# Patient Record
Sex: FEMALE | Race: WHITE | Employment: OTHER | URBAN - METROPOLITAN AREA
[De-identification: names, ages, dates, MRNs, and addresses within clinical notes are randomized per-mention and may not be internally consistent; named-entity substitution may affect disease eponyms.]

---

## 2017-01-17 ENCOUNTER — TELEPHONE (OUTPATIENT)
Dept: CARDIOLOGY CLINIC | Age: 67
End: 2017-01-17

## 2017-01-18 RX ORDER — RANOLAZINE 500 MG/1
TABLET, EXTENDED RELEASE ORAL
Qty: 56 TABLET | Refills: 0 | COMMUNITY
Start: 2017-01-18 | End: 2017-04-19 | Stop reason: SDUPTHER

## 2017-02-13 RX ORDER — ATORVASTATIN CALCIUM 20 MG/1
TABLET, FILM COATED ORAL
Qty: 30 TABLET | Refills: 6 | Status: SHIPPED | OUTPATIENT
Start: 2017-02-13 | End: 2017-08-10 | Stop reason: SDUPTHER

## 2017-02-13 RX ORDER — LISINOPRIL 5 MG/1
TABLET ORAL
Qty: 30 TABLET | Refills: 6 | Status: SHIPPED | OUTPATIENT
Start: 2017-02-13 | End: 2017-08-10 | Stop reason: SDUPTHER

## 2017-03-13 RX ORDER — CLOPIDOGREL BISULFATE 75 MG/1
TABLET ORAL
Qty: 30 TABLET | Refills: 5 | Status: SHIPPED | OUTPATIENT
Start: 2017-03-13 | End: 2017-09-10 | Stop reason: SDUPTHER

## 2017-04-19 ENCOUNTER — TELEPHONE (OUTPATIENT)
Dept: CARDIOLOGY CLINIC | Age: 67
End: 2017-04-19

## 2017-04-19 RX ORDER — RANOLAZINE 500 MG/1
TABLET, EXTENDED RELEASE ORAL
Qty: 90 TABLET | Refills: 3 | Status: SHIPPED | OUTPATIENT
Start: 2017-04-19 | End: 2017-11-14 | Stop reason: SDUPTHER

## 2017-05-30 DIAGNOSIS — E78.2 MIXED HYPERLIPIDEMIA: ICD-10-CM

## 2017-05-30 LAB
ALBUMIN SERPL-MCNC: 4.2 G/DL (ref 3.4–5)
ALP BLD-CCNC: 47 U/L (ref 40–129)
ALT SERPL-CCNC: 24 U/L (ref 10–40)
AST SERPL-CCNC: 20 U/L (ref 15–37)
BILIRUB SERPL-MCNC: 0.3 MG/DL (ref 0–1)
BILIRUBIN DIRECT: <0.2 MG/DL (ref 0–0.3)
BILIRUBIN, INDIRECT: NORMAL MG/DL (ref 0–1)
CHOLESTEROL, TOTAL: 134 MG/DL (ref 0–199)
HDLC SERPL-MCNC: 33 MG/DL (ref 40–60)
LDL CHOLESTEROL CALCULATED: 58 MG/DL
TOTAL PROTEIN: 6.9 G/DL (ref 6.4–8.2)
TRIGL SERPL-MCNC: 215 MG/DL (ref 0–150)
VLDLC SERPL CALC-MCNC: 43 MG/DL

## 2017-06-14 ENCOUNTER — OFFICE VISIT (OUTPATIENT)
Dept: CARDIOLOGY CLINIC | Age: 67
End: 2017-06-14

## 2017-06-14 VITALS
HEART RATE: 64 BPM | HEIGHT: 63 IN | WEIGHT: 159 LBS | OXYGEN SATURATION: 97 % | SYSTOLIC BLOOD PRESSURE: 120 MMHG | DIASTOLIC BLOOD PRESSURE: 64 MMHG | BODY MASS INDEX: 28.17 KG/M2

## 2017-06-14 DIAGNOSIS — I10 ESSENTIAL HYPERTENSION: ICD-10-CM

## 2017-06-14 DIAGNOSIS — I25.810 CORONARY ARTERY DISEASE INVOLVING CORONARY BYPASS GRAFT OF NATIVE HEART WITHOUT ANGINA PECTORIS: Primary | ICD-10-CM

## 2017-06-14 DIAGNOSIS — E78.2 MIXED HYPERLIPIDEMIA: ICD-10-CM

## 2017-06-14 PROCEDURE — 99214 OFFICE O/P EST MOD 30 MIN: CPT | Performed by: INTERNAL MEDICINE

## 2017-08-10 DIAGNOSIS — I10 ESSENTIAL HYPERTENSION: Primary | ICD-10-CM

## 2017-08-10 DIAGNOSIS — I25.810 CORONARY ARTERY DISEASE INVOLVING CORONARY BYPASS GRAFT OF NATIVE HEART WITHOUT ANGINA PECTORIS: ICD-10-CM

## 2017-08-10 DIAGNOSIS — E78.2 MIXED HYPERLIPIDEMIA: ICD-10-CM

## 2017-08-11 RX ORDER — ATORVASTATIN CALCIUM 20 MG/1
TABLET, FILM COATED ORAL
Qty: 30 TABLET | Refills: 4 | Status: SHIPPED | OUTPATIENT
Start: 2017-08-11 | End: 2017-11-14 | Stop reason: SDUPTHER

## 2017-08-11 RX ORDER — LISINOPRIL 5 MG/1
TABLET ORAL
Qty: 30 TABLET | Refills: 4 | Status: SHIPPED | OUTPATIENT
Start: 2017-08-11 | End: 2018-01-16 | Stop reason: SDUPTHER

## 2017-09-11 RX ORDER — CLOPIDOGREL BISULFATE 75 MG/1
TABLET ORAL
Qty: 30 TABLET | Refills: 5 | Status: SHIPPED | OUTPATIENT
Start: 2017-09-11 | End: 2018-03-15 | Stop reason: SDUPTHER

## 2017-11-14 ENCOUNTER — TELEPHONE (OUTPATIENT)
Dept: CARDIOLOGY CLINIC | Age: 67
End: 2017-11-14

## 2017-11-14 RX ORDER — RANOLAZINE 500 MG/1
TABLET, EXTENDED RELEASE ORAL
Qty: 56 TABLET | Refills: 0 | COMMUNITY
Start: 2017-11-14 | End: 2018-05-01 | Stop reason: SDUPTHER

## 2017-11-14 RX ORDER — ATORVASTATIN CALCIUM 20 MG/1
TABLET, FILM COATED ORAL
Qty: 30 TABLET | Refills: 6 | Status: SHIPPED | OUTPATIENT
Start: 2017-11-14 | End: 2018-05-17 | Stop reason: SDUPTHER

## 2017-11-14 NOTE — TELEPHONE ENCOUNTER
Patient last seen on 6/14/17. Scheduled to return on 12/18/17. Prepared 4 cards of samples, 14 pills per card, total of 56 tablets. Called patient and let her know that she can  at [facility] office.

## 2017-11-14 NOTE — TELEPHONE ENCOUNTER
Sample requested: Ranexa    Strength:  500 mg    Dosage: BID    Patient's call back number: [phone]

## 2017-12-14 RX ORDER — FENOFIBRATE 160 MG/1
TABLET ORAL
Qty: 90 TABLET | Refills: 3 | Status: SHIPPED | OUTPATIENT
Start: 2017-12-14 | End: 2018-12-12 | Stop reason: SDUPTHER

## 2018-01-10 ENCOUNTER — OFFICE VISIT (OUTPATIENT)
Dept: CARDIOLOGY CLINIC | Age: 68
End: 2018-01-10

## 2018-01-10 VITALS
HEIGHT: 63 IN | DIASTOLIC BLOOD PRESSURE: 78 MMHG | SYSTOLIC BLOOD PRESSURE: 134 MMHG | WEIGHT: 164 LBS | HEART RATE: 70 BPM | OXYGEN SATURATION: 97 % | BODY MASS INDEX: 29.06 KG/M2

## 2018-01-10 DIAGNOSIS — I25.810 CORONARY ARTERY DISEASE INVOLVING CORONARY BYPASS GRAFT OF NATIVE HEART WITHOUT ANGINA PECTORIS: Primary | ICD-10-CM

## 2018-01-10 DIAGNOSIS — E78.2 MIXED HYPERLIPIDEMIA: ICD-10-CM

## 2018-01-10 DIAGNOSIS — I10 ESSENTIAL HYPERTENSION: ICD-10-CM

## 2018-01-10 PROCEDURE — 93000 ELECTROCARDIOGRAM COMPLETE: CPT | Performed by: INTERNAL MEDICINE

## 2018-01-10 PROCEDURE — 99214 OFFICE O/P EST MOD 30 MIN: CPT | Performed by: INTERNAL MEDICINE

## 2018-01-10 NOTE — PATIENT INSTRUCTIONS
[email or web address], and be sure to contact your doctor if:  ? · You would like help planning heart-healthy meals. Where can you learn more? Go to [email or web address] and sign in to your [employer] account. Enter V137 in the [employer] box to learn more about \"Heart-Healthy Diet: Care Instructions. \"     If you do not have an account, please click on the \"Sign Up Now\" link. Current as of: September 21, 2016  Content Version: 11.5  © 7923-2606 [employer]. Care instructions adapted under license by [address] If you have questions about a medical condition or this instruction, always ask your healthcare professional. [place] [address] any warranty or liability for your use of this information. Patient Education        High Blood Pressure: Care Instructions  Your Care Instructions    If your blood pressure is usually above 140/90, you have high blood pressure, or hypertension. That means the top number is 140 or higher or the bottom number is 90 or higher, or both. Despite what a lot of people think, high blood pressure usually doesn't cause headaches or make you feel dizzy or lightheaded. It usually has no symptoms. But it does increase your risk for heart attack, stroke, and kidney or eye damage. The higher your blood pressure, the more your risk increases. Your doctor will give you a goal for your blood pressure. Your goal will be based on your health and your age. An example of a goal is to keep your blood pressure below 140/90. Lifestyle changes, such as eating healthy and being active, are always important to help lower blood pressure. You might also take medicine to reach your blood pressure goal.  Follow-up care is a key part of your treatment and safety. Be sure to make and go to all appointments, and call your doctor if you are having problems. It's also a good idea to know your test results and keep a list of the medicines you take.   How can you care for yourself at home? Medical treatment  · If you stop taking your medicine, your blood pressure will go back up. You may take one or more types of medicine to lower your blood pressure. Be safe with medicines. Take your medicine exactly as prescribed. Call your doctor if you think you are having a problem with your medicine. · Talk to your doctor before you start taking aspirin every day. Aspirin can help certain people lower their risk of a heart attack or stroke. But taking aspirin isn't right for everyone, because it can cause serious bleeding. · See your doctor regularly. You may need to see the doctor more often at first or until your blood pressure comes down. · If you are taking blood pressure medicine, talk to your doctor before you take decongestants or anti-inflammatory medicine, such as ibuprofen. Some of these medicines can raise blood pressure. · Learn how to check your blood pressure at home. Lifestyle changes  · Stay at a healthy weight. This is especially important if you put on weight around the waist. Losing even 10 pounds can help you lower your blood pressure. · If your doctor recommends it, get more exercise. Walking is a good choice. Bit by bit, increase the amount you walk every day. Try for at least 30 minutes on most days of the week. You also may want to swim, bike, or do other activities. · Avoid or limit alcohol. Talk to your doctor about whether you can drink any alcohol. · Try to limit how much sodium you eat to less than 2,300 milligrams (mg) a day. Your doctor may ask you to try to eat less than 1,500 mg a day. · Eat plenty of fruits (such as bananas and oranges), vegetables, legumes, whole grains, and low-fat dairy products. · Lower the amount of saturated fat in your diet. Saturated fat is found in animal products such as milk, cheese, and meat. Limiting these foods may help you lose weight and also lower your risk for heart disease. · Do not smoke.  Smoking

## 2018-01-10 NOTE — PROGRESS NOTES
[address]                     Cardiology Progress Note    Dutch He      January 10, 2018     CC: \"I had a few episodes of chest pain\"    HPI:  The patient is 79 y.o. female with a past medical history significant for hypertension, hyperlipidemia, diabetes, CAD s/p stent placement, s/p CABG 2011, depression s/p CABG. Bailee Mosher is here today for a follow up for her CAD. She is feeling well and doing great except for an episode of chest pain she after walking to her car in the extreme cold last week. Otherwise symptoms free and able to perform all ADLs without difficulty. She is compliant and tolerating all his medications. She denies CP, pressure, tightness, edema, SOB, heart racing, palpitations, lightheaded, dizziness, PND or orthopnea.       Past Medical History:   Diagnosis Date    CAD (coronary artery disease)     Diabetes mellitus ([facility]     Hyperlipidemia     Hypertension     Kidney calculi     lithitripsy x2 2009 dec    Pneumonia 2009    Vegetarian diet      Past Surgical History:   Procedure Laterality Date    APPENDECTOMY      BACK SURGERY       SECTION      CHOLECYSTECTOMY      CORONARY ANGIOPLASTY WITH STENT PLACEMENT      LITHOTRIPSY      TONSILLECTOMY      UPPER GASTROINTESTINAL ENDOSCOPY       Family History   Problem Relation Age of Onset    Other Mother     Other Father     Diabetes Maternal Grandmother      Social History   Substance Use Topics    Smoking status: Never Smoker    Smokeless tobacco: Never Used    Alcohol use No       Allergies   Allergen Reactions    Effient [Prasugrel] Swelling     Swelling in tongue, itching    Azithromycin      Thrush on tongue    Dilaudid [Hydromorphone Hcl]      Severe head pain and ear pressure, very weak    Dolobid [Diflunisal] Nausea Only    Doxycycline      n  & v      Vibramycin [Doxycycline Calcium]      Severe nausea and vomiting    Morphine Sulfate Nausea And Vomiting Review of Systems:  Review of systems is as detailed above and all other systems are normal.      Physical Exam:   /78   Pulse 70   Ht 5' 3\" (1.6 m)   Wt 164 lb (74.4 kg) Comment: without shoes  SpO2 97%   BMI 29.05 kg/m²   Wt Readings from Last 3 Encounters:   01/10/18 164 lb (74.4 kg)   06/14/17 159 lb (72.1 kg)   11/29/16 158 lb 9.6 oz (71.9 kg)     Constitutional: She is oriented to person, place, and time. She appears well-developed and well-nourished. In no acute distress. Head: Normocephalic and atraumatic. Pupils equal and round. Neck: Neck supple. No JVP or carotid bruit appreciated. No mass and no thyromegaly present. No lymphadenopathy present. Cardiovascular: Normal rate. Normal heart sounds. Exam reveals no gallop and no friction rub. No murmur heard. Pulmonary/Chest: Effort normal and breath sounds normal. No respiratory distress. She has no wheezes, rhonchi or rales. Abdominal: Soft, non-tender. Bowel sounds are normal. She exhibits no organomegaly, mass or bruit. Extremities: No edema, cyanosis, or clubbing. Pulses are 2+ radial/dorsalis pedis/posterior tibial/carotid bilaterally. Neurological: No gross cranial nerve deficit. Coordination normal.   Skin: Skin is warm and dry. There is no rash or diaphoresis. Psychiatric: She has a normal mood and affect.  Her speech is normal and behavior is normal.   Current Outpatient Prescriptions   Medication Sig Dispense Refill    fenofibrate 160 MG tablet TAKE 1 TABLET EVERY DAY 90 tablet 3    atorvastatin (LIPITOR) 20 MG tablet TAKE 1 TABLET NIGHTLY 30 tablet 6    ranolazine (RANEXA) 500 MG extended release tablet TAKE ONE TABLET BY MOUTH TWICE DAILY 56 tablet 0    clopidogrel (PLAVIX) 75 MG tablet TAKE 1 TABLET EVERY DAY 30 tablet 5    lisinopril (PRINIVIL;ZESTRIL) 5 MG tablet TAKE 1 TABLET EVERY DAY 30 tablet 4    nitroGLYCERIN (NITROSTAT) 0.4 MG SL tablet Place 1 tablet under the tongue every 5 minutes as needed for Chest pain 25 tablet 6    niacin 500 MG tablet Take 500 mg by mouth 3 times daily      Handicap Placard MISC by Does not apply route Handicap to  in 5 years for patient's history of CAD. Patient unable to walk more than 200 feet without stopping to rest. 1 each 0    metoprolol (LOPRESSOR) 25 MG tablet Take 1 tablet by mouth 2 times daily. 180 tablet 3    Multiple Vitamins-Minerals (CENTRUM SILVER PO) Take  by mouth daily.  aspirin EC 81 MG EC tablet Take 1 tablet by mouth daily. 30 tablet 3    omeprazole (PRILOSEC) 20 MG capsule Take 20 mg by mouth as needed.  promethazine (PHENERGAN) 25 MG tablet Take 25 mg by mouth every 6 hours as needed for Nausea.  meclizine (ANTIVERT) 25 MG tablet Take 25 mg by mouth 3 times daily as needed.  Omega-3 Fatty Acids (FISH OIL BURP-LESS) 1200 MG CAPS Take 2,400 mg by mouth.  metformin (GLUCOPHAGE) 500 MG tablet Take 500 mg by mouth 4 times daily (before meals and nightly)       cetirizine (ZYRTEC) 10 MG tablet Take 10 mg by mouth as needed. No current facility-administered medications for this visit.       Facility-Administered Medications Ordered in Other Visits   Medication Dose Route Frequency Provider Last Rate Last Dose    aminocaproic acid (AMICAR) 5 g in sodium chloride 0.9 % 250 mL IV bolus  5 g Intravenous Once Margot Simon MD        insulin regular (HUMULIN R;NOVOLIN R) 100 Units in sodium chloride 0.9 % 100 mL infusion  0.1 Units/kg/hr Intravenous Once Margot Simon MD        phenylephrine (COLIN-SYNEPHRINE) 50 mg in dextrose 5 % 250 mL infusion  100 mcg/min Intravenous Once Margot Simon MD        lactated ringers infusion   Intravenous Once Margot Simon MD        pantoprazole (PROTONIX) injection 40 mg  40 mg Intravenous Once Lily Ortiz MD           Lab Review:     Lab Results   Component Value Date    TRIG 215 2017    HDL 33 2017    HDL 27 2012    LDLCALC 58 2017    LDLDIRECT 71 10/27/2016    LABVLDL 43 05/30/2017    10/27/16  Lab Results   Component Value Date    HGB 12.4 07/16/2014    HCT 37.8 07/16/2014       Lab Results   Component Value Date     08/10/2016      Lab Results   Component Value Date    K 4.7 08/10/2016     Lab Results   Component Value Date    BUN 13 08/10/2016    CREATININE 0.8 08/10/2016   HgbA1c 6.2 11/17/16    EKG interpretation: 1/10 2018-normal sinus rhythm with minor ST and T wave abnormalities    Image Review:     CABGx3 4/2011 at [facility] per Dr. Gemma Doss    Stress:10/10/2013  Summary   Normal myocardial perfusion study. Normal LV size and systolic function. ECG findings reported separately. Cath:3/27/2014  POSTOPERATIVE DIAGNOSES:  1. Stenosis 90% to 95% proximal, 40% to 50% mid and 80% distal right coronary artery stenosis with a competitive flow in the posterolateral and posterior descending artery branches. 2. Patent left main trunk. 3. Mild disease of the proximal left anterior descending and 100% mid left anterior descending occlusion. 4. Stenosis 50% to 60% of the proximal portion of the obtuse marginal branch and 80% stenosis of the mid portion of the obtuse marginal branch. 5. Saphenous vein graft to distal right coronary artery widely patent with mild aneurysmal dilatation of the saphenous vein graft. 6. Patent saphenous vein graft to the diagonal branch with evidence of mild aneurysmal dilatation. 7. Patent left internal mammary artery to the left anterior descending with diffuse disease of the left anterior descending beyond anastomosis. 8. Normal left ventricular systolic function with a _____ of 55% to 60% with normal left heart hemodynamics. 9. Patent femoral and iliac artery. 10. Successful angioplasty of the circumflex artery which was partially complicated by a proximal dissection requiring 2 Drug-Eluting stents with a final diameter of the proximal area of 2.42 and a distal area of 2.37.      EKG 1/10 2018-normal sinus rhythm with minor ST and T wave abnormalities. Assessment/Plan:     Coronary artery disease  Pt denies symptoms of angina today. Pt is on BBlocker, Statin and Antiplatelet therapy.      HTN (hypertension)  Blood pressure is stable today. Renal panel from 5/2017 reveals stable creatinine of 0.72.       Hyperlipidemia  Last lipid profile from 5/30/17 showed significant improvement with TG now down to 215 from 312. Her hemoglbin A1C is down to 6.2% . I will repeat lipid profile before the next visit. Patient to follow up 6 months. Thank you very much for allowing me to participate in the care of your patient. Please do not hesitate to contact me if you have any questions.     Sincerely,  Mila Gómez MD      [address], [address] [place] [address] Zak Harley [address]  Ph: [phone]  Fax: [phone]

## 2018-01-16 RX ORDER — LISINOPRIL 5 MG/1
TABLET ORAL
Qty: 30 TABLET | Refills: 5 | Status: SHIPPED | OUTPATIENT
Start: 2018-01-16 | End: 2018-08-09 | Stop reason: SDUPTHER

## 2018-01-16 NOTE — TELEPHONE ENCOUNTER
Last O/V:  1/10/18  Next O/V:  7/16/18    Last Labs:  CMP:  6/14/17 ( In [facility])    Please sign in Dr. Kiet Powers absence, thank you.

## 2018-03-15 RX ORDER — CLOPIDOGREL BISULFATE 75 MG/1
TABLET ORAL
Qty: 30 TABLET | Refills: 12 | Status: SHIPPED | OUTPATIENT
Start: 2018-03-15 | End: 2019-04-01 | Stop reason: SDUPTHER

## 2018-05-01 RX ORDER — RANOLAZINE 500 MG/1
TABLET, FILM COATED, EXTENDED RELEASE ORAL
Qty: 180 TABLET | Refills: 3 | Status: SHIPPED | OUTPATIENT
Start: 2018-05-01 | End: 2018-05-08 | Stop reason: SDUPTHER

## 2018-05-08 RX ORDER — RANOLAZINE 500 MG/1
TABLET, FILM COATED, EXTENDED RELEASE ORAL
Qty: 180 TABLET | Refills: 2 | Status: SHIPPED | OUTPATIENT
Start: 2018-05-08 | End: 2019-03-28 | Stop reason: SDUPTHER

## 2018-05-18 RX ORDER — ATORVASTATIN CALCIUM 20 MG/1
TABLET, FILM COATED ORAL
Qty: 30 TABLET | Refills: 2 | Status: SHIPPED | OUTPATIENT
Start: 2018-05-18 | End: 2018-07-11 | Stop reason: ALTCHOICE

## 2018-06-11 ENCOUNTER — TELEPHONE (OUTPATIENT)
Dept: CARDIOLOGY CLINIC | Age: 68
End: 2018-06-11

## 2018-06-20 RX ORDER — NITROGLYCERIN 0.4 MG/1
0.4 TABLET SUBLINGUAL EVERY 5 MIN PRN
Qty: 25 TABLET | Refills: 6 | Status: SHIPPED | OUTPATIENT
Start: 2018-06-20 | End: 2020-03-02

## 2018-07-10 DIAGNOSIS — E78.2 MIXED HYPERLIPIDEMIA: ICD-10-CM

## 2018-07-10 LAB
A/G RATIO: 1.8 (ref 1.1–2.2)
ALBUMIN SERPL-MCNC: 4.4 G/DL (ref 3.4–5)
ALP BLD-CCNC: 55 U/L (ref 40–129)
ALT SERPL-CCNC: 23 U/L (ref 10–40)
ANION GAP SERPL CALCULATED.3IONS-SCNC: 17 MMOL/L (ref 3–16)
AST SERPL-CCNC: 23 U/L (ref 15–37)
BILIRUB SERPL-MCNC: <0.2 MG/DL (ref 0–1)
BUN BLDV-MCNC: 14 MG/DL (ref 7–20)
CALCIUM SERPL-MCNC: 9.9 MG/DL (ref 8.3–10.6)
CHLORIDE BLD-SCNC: 102 MMOL/L (ref 99–110)
CHOLESTEROL, TOTAL: 148 MG/DL (ref 0–199)
CO2: 25 MMOL/L (ref 21–32)
CREAT SERPL-MCNC: 0.7 MG/DL (ref 0.6–1.2)
GFR AFRICAN AMERICAN: >60
GFR NON-AFRICAN AMERICAN: >60
GLOBULIN: 2.4 G/DL
GLUCOSE BLD-MCNC: 154 MG/DL (ref 70–99)
HDLC SERPL-MCNC: 27 MG/DL (ref 40–60)
LDL CHOLESTEROL CALCULATED: ABNORMAL MG/DL
LDL CHOLESTEROL DIRECT: 81 MG/DL
POTASSIUM SERPL-SCNC: 4.7 MMOL/L (ref 3.5–5.1)
SODIUM BLD-SCNC: 144 MMOL/L (ref 136–145)
TOTAL PROTEIN: 6.8 G/DL (ref 6.4–8.2)
TRIGL SERPL-MCNC: 385 MG/DL (ref 0–150)
VLDLC SERPL CALC-MCNC: ABNORMAL MG/DL

## 2018-07-11 RX ORDER — ROSUVASTATIN CALCIUM 20 MG/1
20 TABLET, COATED ORAL DAILY
Qty: 30 TABLET | Refills: 12 | Status: SHIPPED | OUTPATIENT
Start: 2018-07-11 | End: 2019-08-03 | Stop reason: SDUPTHER

## 2018-07-16 ENCOUNTER — OFFICE VISIT (OUTPATIENT)
Dept: CARDIOLOGY CLINIC | Age: 68
End: 2018-07-16

## 2018-07-16 VITALS
BODY MASS INDEX: 29.41 KG/M2 | HEART RATE: 66 BPM | WEIGHT: 166 LBS | OXYGEN SATURATION: 97 % | HEIGHT: 63 IN | DIASTOLIC BLOOD PRESSURE: 76 MMHG | SYSTOLIC BLOOD PRESSURE: 132 MMHG

## 2018-07-16 DIAGNOSIS — I10 ESSENTIAL HYPERTENSION: Primary | ICD-10-CM

## 2018-07-16 DIAGNOSIS — E78.2 MIXED HYPERLIPIDEMIA: ICD-10-CM

## 2018-07-16 DIAGNOSIS — I25.708 CORONARY ARTERY DISEASE OF BYPASS GRAFT OF NATIVE HEART WITH STABLE ANGINA PECTORIS (HCC): ICD-10-CM

## 2018-07-16 PROCEDURE — 93000 ELECTROCARDIOGRAM COMPLETE: CPT | Performed by: INTERNAL MEDICINE

## 2018-07-16 PROCEDURE — 99215 OFFICE O/P EST HI 40 MIN: CPT | Performed by: INTERNAL MEDICINE

## 2018-07-16 NOTE — PATIENT INSTRUCTIONS
Patient Education        Heart-Healthy Diet: Care Instructions  Your Care Instructions    A heart-healthy diet has lots of vegetables, fruits, nuts, beans, and whole grains, and is low in salt. It limits foods that are high in saturated fat, such as meats, cheeses, and fried foods. It may be hard to change your diet, but even small changes can lower your risk of heart attack and heart disease. Follow-up care is a key part of your treatment and safety. Be sure to make and go to all appointments, and call your doctor if you are having problems. It's also a good idea to know your test results and keep a list of the medicines you take. How can you care for yourself at home? Watch your portions  · Learn what a serving is. A \"serving\" and a \"portion\" are not always the same thing. Make sure that you are not eating larger portions than are recommended. For example, a serving of pasta is ½ cup. A serving size of meat is 2 to 3 ounces. A 3-ounce serving is about the size of a deck of cards. Measure serving sizes until you are good at [place]" them. Keep in mind that restaurants often serve portions that are 2 or 3 times the size of one serving. · To keep your energy level up and keep you from feeling hungry, eat often but in smaller portions. · Eat only the number of calories you need to stay at a healthy weight. If you need to lose weight, eat fewer calories than your body burns (through exercise and other physical activity). Eat more fruits and vegetables  · Eat a variety of fruit and vegetables every day. Dark green, deep orange, red, or yellow fruits and vegetables are especially good for you. Examples include spinach, carrots, peaches, and berries. · Keep carrots, celery, and other veggies handy for snacks. Buy fruit that is in season and store it where you can see it so that you will be tempted to eat it. · Cook dishes that have a lot of veggies in them, such as stir-fries and soups.   Limit saturated and trans fat  · Read food labels, and try to avoid saturated and trans fats. They increase your risk of heart disease. Trans fat is found in many processed foods such as cookies and crackers. · Use olive or canola oil when you cook. Try cholesterol-lowering spreads, such as Benecol or Take Control. · Bake, broil, grill, or steam foods instead of frying them. · Choose lean meats instead of high-fat meats such as hot dogs and sausages. Cut off all visible fat when you prepare meat. · Eat fish, skinless poultry, and meat alternatives such as soy products instead of high-fat meats. Soy products, such as tofu, may be especially good for your heart. · Choose low-fat or fat-free milk and dairy products. Eat fish  · Eat at least two servings of fish a week. Certain fish, such as salmon and tuna, contain omega-3 fatty acids, which may help reduce your risk of heart attack. Eat foods high in fiber  · Eat a variety of grain products every day. Include whole-grain foods that have lots of fiber and nutrients. Examples of whole-grain foods include oats, whole wheat bread, and brown rice. · Buy whole-grain breads and cereals, instead of white bread or pastries. Limit salt and sodium  · Limit how much salt and sodium you eat to help lower your blood pressure. · Taste food before you salt it. Add only a little salt when you think you need it. With time, your taste buds will adjust to less salt. · Eat fewer snack items, fast foods, and other high-salt, processed foods. Check food labels for the amount of sodium in packaged foods. · Choose low-sodium versions of canned goods (such as soups, vegetables, and beans). Limit sugar  · Limit drinks and foods with added sugar. These include candy, desserts, and soda pop. Limit alcohol  · Limit alcohol to no more than 2 drinks a day for men and 1 drink a day for women. Too much alcohol can cause health problems. When should you call for help?   Watch closely for changes in your quitting, talk to your doctor about stop-smoking programs and medicines. These can increase your chances of quitting for good. When should you call for help? Call 911 anytime you think you may need emergency care. This may mean having symptoms that suggest that your blood pressure is causing a serious heart or blood vessel problem. Your blood pressure may be over 180/110.   For example, call 911 if:    · You have symptoms of a heart attack. These may include:  ¨ Chest pain or pressure, or a strange feeling in the chest.  ¨ Sweating. ¨ Shortness of breath. ¨ Nausea or vomiting. ¨ Pain, pressure, or a strange feeling in the back, neck, jaw, or upper belly or in one or both shoulders or arms. ¨ Lightheadedness or sudden weakness. ¨ A fast or irregular heartbeat.     · You have symptoms of a stroke. These may include:  ¨ Sudden numbness, tingling, weakness, or loss of movement in your face, arm, or leg, especially on only one side of your body. ¨ Sudden vision changes. ¨ Sudden trouble speaking. ¨ Sudden confusion or trouble understanding simple statements. ¨ Sudden problems with walking or balance. ¨ A sudden, severe headache that is different from past headaches.     · You have severe back or belly pain.    Do not wait until your blood pressure comes down on its own. Get help right away.   Call your doctor now or seek immediate care if:    · Your blood pressure is much higher than normal (such as 180/110 or higher), but you don't have symptoms.     · You think high blood pressure is causing symptoms, such as:  ¨ Severe headache. ¨ Blurry vision.    Watch closely for changes in your health, and be sure to contact your doctor if:    · Your blood pressure measures 140/90 or higher at least 2 times.  That means the top number is 140 or higher or the bottom number is 90 or higher, or both.     · You think you may be having side effects from your blood pressure medicine.     · Your blood pressure is usually normal, but it goes above normal at least 2 times. Where can you learn more? Go to [email or web address] and sign in to your [employer] [facility] account. Enter Y078 in the [facility] box to learn more about \"High Blood Pressure: Care Instructions. \"     If you do not have an account, please click on the \"Sign Up Now\" link. Current as of: December 6, 2017  Content Version: 11.6  © 9127-6429 [employer]. Care instructions adapted under license by [facility] ([facility]). If you have questions about a medical condition or this instruction, always ask your healthcare professional. [address] any warranty or liability for your use of this information.

## 2018-07-16 NOTE — PROGRESS NOTES
MG tablet TAKE 1 TABLET EVERY DAY 90 tablet 3    niacin 500 MG tablet Take 500 mg by mouth 3 times daily      Handicap Placard MISC by Does not apply route Handicap to  in 5 years for patient's history of CAD. Patient unable to walk more than 200 feet without stopping to rest. 1 each 0    metoprolol (LOPRESSOR) 25 MG tablet Take 1 tablet by mouth 2 times daily. 180 tablet 3    Multiple Vitamins-Minerals (CENTRUM SILVER PO) Take  by mouth daily.  aspirin EC 81 MG EC tablet Take 1 tablet by mouth daily. 30 tablet 3    omeprazole (PRILOSEC) 20 MG capsule Take 20 mg by mouth as needed.  promethazine (PHENERGAN) 25 MG tablet Take 25 mg by mouth every 6 hours as needed for Nausea.  meclizine (ANTIVERT) 25 MG tablet Take 25 mg by mouth 3 times daily as needed.  Omega-3 Fatty Acids (FISH OIL BURP-LESS) 1200 MG CAPS Take 2,400 mg by mouth.  metformin (GLUCOPHAGE) 500 MG tablet Take 500 mg by mouth 4 times daily (before meals and nightly)       cetirizine (ZYRTEC) 10 MG tablet Take 10 mg by mouth as needed. No current facility-administered medications for this visit.       Facility-Administered Medications Ordered in Other Visits   Medication Dose Route Frequency Provider Last Rate Last Dose    aminocaproic acid (AMICAR) 5 g in sodium chloride 0.9 % 250 mL IV bolus  5 g Intravenous Once Irma Arango MD        insulin regular (HUMULIN R;NOVOLIN R) 100 Units in sodium chloride 0.9 % 100 mL infusion  0.1 Units/kg/hr Intravenous Once Irma Arango MD        phenylephrine (COLIN-SYNEPHRINE) 50 mg in dextrose 5 % 250 mL infusion  100 mcg/min Intravenous Once Irma Arango MD        lactated ringers infusion   Intravenous Once Irma Arango MD        pantoprazole (PROTONIX) injection 40 mg  40 mg Intravenous Once Anusha Gandhi MD           Lab Review:     Lab Results   Component Value Date    TRIG 385 07/10/2018    HDL 27 07/10/2018    HDL 27 2012    LDLCALC see below 07/10/2018    LDLDIRECT 81 07/10/2018    LABVLDL see below 07/10/2018    10/27/16  Lab Results   Component Value Date    HGB 12.4 07/16/2014    HCT 37.8 07/16/2014       Lab Results   Component Value Date     07/10/2018      Lab Results   Component Value Date    K 4.7 07/10/2018     Lab Results   Component Value Date    BUN 14 07/10/2018    CREATININE 0.7 07/10/2018   HgbA1c 6.2 11/17/16    EKG interpretation: 1/10 2018-normal sinus rhythm with minor ST and T wave abnormalities     7/16/18-Sinus  Rhythm Nonspecific T-abnormality. Image Review:     CABGx3 4/2011 at [facility] per Dr. Amisha Hammond    Stress:10/10/2013  Summary   Normal myocardial perfusion study. Normal LV size and systolic function. ECG findings reported separately. Cath:3/27/2014  POSTOPERATIVE DIAGNOSES:  1. Stenosis 90% to 95% proximal, 40% to 50% mid and 80% distal right coronary artery stenosis with a competitive flow in the posterolateral and posterior descending artery branches. 2. Patent left main trunk. 3. Mild disease of the proximal left anterior descending and 100% mid left anterior descending occlusion. 4. Stenosis 50% to 60% of the proximal portion of the obtuse marginal branch and 80% stenosis of the mid portion of the obtuse marginal branch. 5. Saphenous vein graft to distal right coronary artery widely patent with mild aneurysmal dilatation of the saphenous vein graft. 6. Patent saphenous vein graft to the diagonal branch with evidence of mild aneurysmal dilatation. 7. Patent left internal mammary artery to the left anterior descending with diffuse disease of the left anterior descending beyond anastomosis. 8. Normal left ventricular systolic function with a _____ of 55% to 60% with normal left heart hemodynamics. 9. Patent femoral and iliac artery.   10. Successful angioplasty of the circumflex artery which was partially complicated by a proximal dissection requiring 2 Drug-Eluting stents with a final diameter of the

## 2018-07-20 ENCOUNTER — HOSPITAL ENCOUNTER (OUTPATIENT)
Dept: NON INVASIVE DIAGNOSTICS | Age: 68
Discharge: HOME | End: 2018-07-20
Admitting: INTERNAL MEDICINE

## 2018-07-20 DIAGNOSIS — I25.708 ATHEROSCLEROSIS OF CORONARY ARTERY BYPASS GRAFT(S), UNSPECIFIED, WITH OTHER FORMS OF ANGINA PECTORIS (HCC): ICD-10-CM

## 2018-07-20 LAB
LV EF: 70 %
LVEF MODALITY: NORMAL

## 2018-07-30 ENCOUNTER — OFFICE VISIT (OUTPATIENT)
Dept: CARDIOLOGY CLINIC | Age: 68
End: 2018-07-30

## 2018-07-30 VITALS
BODY MASS INDEX: 29.41 KG/M2 | HEART RATE: 72 BPM | SYSTOLIC BLOOD PRESSURE: 136 MMHG | WEIGHT: 166 LBS | OXYGEN SATURATION: 97 % | HEIGHT: 63 IN | DIASTOLIC BLOOD PRESSURE: 74 MMHG

## 2018-07-30 DIAGNOSIS — I25.10 CORONARY ARTERY DISEASE INVOLVING NATIVE CORONARY ARTERY OF NATIVE HEART WITHOUT ANGINA PECTORIS: Primary | ICD-10-CM

## 2018-07-30 DIAGNOSIS — E78.5 HYPERLIPIDEMIA LDL GOAL <70: ICD-10-CM

## 2018-07-30 DIAGNOSIS — I10 ESSENTIAL HYPERTENSION: ICD-10-CM

## 2018-07-30 PROCEDURE — 99214 OFFICE O/P EST MOD 30 MIN: CPT | Performed by: INTERNAL MEDICINE

## 2018-07-30 NOTE — PROGRESS NOTES
tablet 3    niacin 500 MG tablet Take 500 mg by mouth 3 times daily      Handicap Placard MISC by Does not apply route Handicap to  in 5 years for patient's history of CAD. Patient unable to walk more than 200 feet without stopping to rest. 1 each 0    metoprolol (LOPRESSOR) 25 MG tablet Take 1 tablet by mouth 2 times daily. 180 tablet 3    Multiple Vitamins-Minerals (CENTRUM SILVER PO) Take  by mouth daily.  aspirin EC 81 MG EC tablet Take 1 tablet by mouth daily. 30 tablet 3    omeprazole (PRILOSEC) 20 MG capsule Take 20 mg by mouth as needed.  promethazine (PHENERGAN) 25 MG tablet Take 25 mg by mouth every 6 hours as needed for Nausea.  meclizine (ANTIVERT) 25 MG tablet Take 25 mg by mouth 3 times daily as needed.  Omega-3 Fatty Acids (FISH OIL BURP-LESS) 1200 MG CAPS Take 2,400 mg by mouth.  metformin (GLUCOPHAGE) 500 MG tablet Take 500 mg by mouth 4 times daily (before meals and nightly)       cetirizine (ZYRTEC) 10 MG tablet Take 10 mg by mouth as needed. No current facility-administered medications for this visit.       Facility-Administered Medications Ordered in Other Visits   Medication Dose Route Frequency Provider Last Rate Last Dose    aminocaproic acid (AMICAR) 5 g in sodium chloride 0.9 % 250 mL IV bolus  5 g Intravenous Once Wyatt Dubon MD        insulin regular (HUMULIN R;NOVOLIN R) 100 Units in sodium chloride 0.9 % 100 mL infusion  0.1 Units/kg/hr Intravenous Once Wyatt Dubon MD        phenylephrine (COLIN-SYNEPHRINE) 50 mg in dextrose 5 % 250 mL infusion  100 mcg/min Intravenous Once Wyatt Dubon MD        lactated ringers infusion   Intravenous Once Wyatt Dubon MD        pantoprazole (PROTONIX) injection 40 mg  40 mg Intravenous Once Tacho Jerome MD           Lab Review:     Lab Results   Component Value Date    TRIG 385 07/10/2018    HDL 27 07/10/2018    HDL 27 2012    LDLCALC see below 07/10/2018    LDLDIRECT 81 a distal area of 2.37. Stress test 7/20/18      1. Technically a satisfactory study.    2. Non diagnostic pharmacological stress portion of the study.    3. Moderate sized mild intensity reversible defect involving the inferior    wall that may suggest ischemia. Bowel adjacent to inferior wall may affect    the accuracy of the study.    4. Gated Study shows normal LV size and Systolic function; EF is 70 %. EKG 1/10 2018-normal sinus rhythm with minor ST and T wave abnormalities. Assessment/Plan:     Coronary artery disease  Patient is experiencing exertional chest tightness suggestive of angina today. Her nuclear stress test from 7/20/18 was abnormal for reversible ischemia in inf wall. I have recommended a repeat angiogram. She would like to take time to decide if she would like to proceed. I have told her to keep fresh supply of s/l NG & go to ER$ if her CP does not resolve after 2 NG. She may participate in light exercise. HTN (hypertension)  Blood pressure is stable today. Renal panel from 7/2018 reveals stable creatinine of 0.7.       Hyperlipidemia  Continue Crestor 20 mg daily. Will repeat fasting lipid profile in 3 months as discussed at last office visit. Patient to follow up 3 months. Thank you very much for allowing me to participate in the care of your patient. Please do not hesitate to contact me if you have any questions.     Sincerely,  Christina Cabrera MD      [address] [place] [facility]  Ph: [phone]  Fax: [phone]

## 2018-07-30 NOTE — PATIENT INSTRUCTIONS
Patient Education        Learning About Coronary Artery Disease (CAD)  What is coronary artery disease? Coronary artery disease (CAD) occurs when plaque builds up in the arteries that bring oxygen-rich blood to your heart. Plaque is a fatty substance made of cholesterol, calcium, and other substances in the blood. This process is called hardening of the arteries, or atherosclerosis. What happens when you have coronary artery disease? · Plaque may narrow the coronary arteries. Narrowed arteries cause poor blood flow. This can lead to angina symptoms such as chest pain or discomfort. If blood flow is completely blocked, you could have a heart attack. · You can slow CAD and reduce the risk of future problems by making changes in your lifestyle. These include quitting smoking and eating heart-healthy foods. · Treatments for CAD, along with changes in your lifestyle, can help you live a longer and healthier life. How can you prevent coronary artery disease? · Do not smoke. It may be the best thing you can do to prevent heart disease. If you need help quitting, talk to your doctor about stop-smoking programs and medicines. These can increase your chances of quitting for good. · Be active. Get at least 30 minutes of exercise on most days of the week. Walking is a good choice. You also may want to do other activities, such as running, swimming, cycling, or playing tennis or team sports. · Eat heart-healthy foods. Eat more fruits and vegetables and less foods that contain saturated and trans fats. Limit alcohol, sodium, and sweets. · Stay at a healthy weight. Lose weight if you need to. · Manage other health problems such as diabetes, high blood pressure, and high cholesterol. · Manage stress. Stress can hurt your heart. To keep stress low, talk about your problems and feelings. Don't keep your feelings hidden. · If you have talked about it with your doctor, take a low-dose aspirin every day.  Aspirin can help certain people lower their risk of a heart attack or stroke. But taking aspirin isn't right for everyone, because it can cause serious bleeding. Do not start taking daily aspirin unless your doctor knows about it. How is coronary artery disease treated? · Your doctor will suggest that you make lifestyle changes. For example, your doctor may ask you to eat healthy foods, quit smoking, lose extra weight, and be more active. · You will have to take medicines. · Your doctor may suggest a procedure to open narrowed or blocked arteries. This is called angioplasty. Or your doctor may suggest using healthy blood vessels to create detours around narrowed or blocked arteries. This is called bypass surgery. Follow-up care is a key part of your treatment and safety. Be sure to make and go to all appointments, and call your doctor if you are having problems. It's also a good idea to know your test results and keep a list of the medicines you take. Where can you learn more? Go to https://appweevr.[employer]. org and sign in to your Qu[employer]by account. Enter [phone] in the [employer].com box to learn more about \"Learning About Coronary Artery Disease (CAD). \"     If you do not have an account, please click on the \"Sign Up Now\" link. Current as of: December 6, 2017  Content Version: 11.6  © 1065-4911 [employer]. Care instructions adapted under license by [facility] ([facility]). If you have questions about a medical condition or this instruction, always ask your healthcare professional. [place] [address] any warranty or liability for your use of this information. Patient Education        Heart-Healthy Diet: Care Instructions  Your Care Instructions    A heart-healthy diet has lots of vegetables, fruits, nuts, beans, and whole grains, and is low in salt. It limits foods that are high in saturated fat, such as meats, cheeses, and fried foods.  It may be hard to change your diet, but steam foods instead of frying them. · Choose lean meats instead of high-fat meats such as hot dogs and sausages. Cut off all visible fat when you prepare meat. · Eat fish, skinless poultry, and meat alternatives such as soy products instead of high-fat meats. Soy products, such as tofu, may be especially good for your heart. · Choose low-fat or fat-free milk and dairy products. Eat fish  · Eat at least two servings of fish a week. Certain fish, such as salmon and tuna, contain omega-3 fatty acids, which may help reduce your risk of heart attack. Eat foods high in fiber  · Eat a variety of grain products every day. Include whole-grain foods that have lots of fiber and nutrients. Examples of whole-grain foods include oats, whole wheat bread, and brown rice. · Buy whole-grain breads and cereals, instead of white bread or pastries. Limit salt and sodium  · Limit how much salt and sodium you eat to help lower your blood pressure. · Taste food before you salt it. Add only a little salt when you think you need it. With time, your taste buds will adjust to less salt. · Eat fewer snack items, fast foods, and other high-salt, processed foods. Check food labels for the amount of sodium in packaged foods. · Choose low-sodium versions of canned goods (such as soups, vegetables, and beans). Limit sugar  · Limit drinks and foods with added sugar. These include candy, desserts, and soda pop. Limit alcohol  · Limit alcohol to no more than 2 drinks a day for men and 1 drink a day for women. Too much alcohol can cause health problems. When should you call for help? Watch closely for changes in your health, and be sure to contact your doctor if:    · You would like help planning heart-healthy meals. Where can you learn more? Go to https://chmaggyeb.[facility]. org and sign in to your [employer] Ink account. Enter V137 in the [facility] box to learn more about \"Heart-Healthy Diet: Care Instructions. \"

## 2018-08-09 RX ORDER — LISINOPRIL 5 MG/1
TABLET ORAL
Qty: 90 TABLET | Refills: 1 | Status: SHIPPED | OUTPATIENT
Start: 2018-08-09 | End: 2018-12-07 | Stop reason: SDUPTHER

## 2018-11-05 DIAGNOSIS — E78.2 MIXED HYPERLIPIDEMIA: ICD-10-CM

## 2018-11-05 LAB
A/G RATIO: 1.7 (ref 1.1–2.2)
ALBUMIN SERPL-MCNC: 4.5 G/DL (ref 3.4–5)
ALP BLD-CCNC: 52 U/L (ref 40–129)
ALT SERPL-CCNC: 21 U/L (ref 10–40)
ANION GAP SERPL CALCULATED.3IONS-SCNC: 16 MMOL/L (ref 3–16)
AST SERPL-CCNC: 20 U/L (ref 15–37)
BILIRUB SERPL-MCNC: <0.2 MG/DL (ref 0–1)
BUN BLDV-MCNC: 18 MG/DL (ref 7–20)
CALCIUM SERPL-MCNC: 10.2 MG/DL (ref 8.3–10.6)
CHLORIDE BLD-SCNC: 104 MMOL/L (ref 99–110)
CHOLESTEROL, TOTAL: 135 MG/DL (ref 0–199)
CO2: 23 MMOL/L (ref 21–32)
CREAT SERPL-MCNC: 0.8 MG/DL (ref 0.6–1.2)
GFR AFRICAN AMERICAN: >60
GFR NON-AFRICAN AMERICAN: >60
GLOBULIN: 2.6 G/DL
GLUCOSE BLD-MCNC: 144 MG/DL (ref 70–99)
HDLC SERPL-MCNC: 28 MG/DL (ref 40–60)
LDL CHOLESTEROL CALCULATED: 50 MG/DL
POTASSIUM SERPL-SCNC: 4.7 MMOL/L (ref 3.5–5.1)
SODIUM BLD-SCNC: 143 MMOL/L (ref 136–145)
TOTAL PROTEIN: 7.1 G/DL (ref 6.4–8.2)
TRIGL SERPL-MCNC: 285 MG/DL (ref 0–150)
VLDLC SERPL CALC-MCNC: 57 MG/DL

## 2018-11-12 ENCOUNTER — OFFICE VISIT (OUTPATIENT)
Dept: CARDIOLOGY CLINIC | Age: 68
End: 2018-11-12
Payer: COMMERCIAL

## 2018-11-12 VITALS
WEIGHT: 165 LBS | OXYGEN SATURATION: 98 % | BODY MASS INDEX: 29.23 KG/M2 | SYSTOLIC BLOOD PRESSURE: 134 MMHG | HEART RATE: 78 BPM | HEIGHT: 63 IN | DIASTOLIC BLOOD PRESSURE: 64 MMHG

## 2018-11-12 DIAGNOSIS — E78.2 MIXED HYPERLIPIDEMIA: ICD-10-CM

## 2018-11-12 DIAGNOSIS — I10 ESSENTIAL HYPERTENSION: ICD-10-CM

## 2018-11-12 DIAGNOSIS — I25.10 CORONARY ARTERY DISEASE INVOLVING NATIVE CORONARY ARTERY OF NATIVE HEART WITHOUT ANGINA PECTORIS: Primary | ICD-10-CM

## 2018-11-12 PROCEDURE — 99214 OFFICE O/P EST MOD 30 MIN: CPT | Performed by: INTERNAL MEDICINE

## 2018-11-12 NOTE — PROGRESS NOTES
Handicap to  in 5 years for patient's history of CAD. Patient unable to walk more than 200 feet without stopping to rest. 1 each 0    metoprolol (LOPRESSOR) 25 MG tablet Take 1 tablet by mouth 2 times daily. 180 tablet 3    Multiple Vitamins-Minerals (CENTRUM SILVER PO) Take  by mouth daily.  aspirin EC 81 MG EC tablet Take 1 tablet by mouth daily. 30 tablet 3    omeprazole (PRILOSEC) 20 MG capsule Take 20 mg by mouth as needed.  promethazine (PHENERGAN) 25 MG tablet Take 25 mg by mouth every 6 hours as needed for Nausea.  meclizine (ANTIVERT) 25 MG tablet Take 25 mg by mouth 3 times daily as needed.  Omega-3 Fatty Acids (FISH OIL BURP-LESS) 1200 MG CAPS Take 2,400 mg by mouth.  metformin (GLUCOPHAGE) 500 MG tablet Take 500 mg by mouth 4 times daily (before meals and nightly)       cetirizine (ZYRTEC) 10 MG tablet Take 10 mg by mouth as needed. No current facility-administered medications for this visit.       Facility-Administered Medications Ordered in Other Visits   Medication Dose Route Frequency Provider Last Rate Last Dose    aminocaproic acid (AMICAR) 5 g in sodium chloride 0.9 % 250 mL IV bolus  5 g Intravenous Once Breezy Beatty MD        insulin regular (HUMULIN R;NOVOLIN R) 100 Units in sodium chloride 0.9 % 100 mL infusion  0.1 Units/kg/hr Intravenous Once Breezy Beatty MD        phenylephrine (COLIN-SYNEPHRINE) 50 mg in dextrose 5 % 250 mL infusion  100 mcg/min Intravenous Once Breezy Beatty MD        lactated ringers infusion   Intravenous Once Breezy Beatty MD        pantoprazole (PROTONIX) injection 40 mg  40 mg Intravenous Once Alfred Waller MD           Lab Review:     Lab Results   Component Value Date    TRIG 285 2018    HDL 28 2018    HDL 27 2012    LDLCALC 50 2018    LDLDIRECT 81 07/10/2018    LABVLDL 57 2018    10/27/16  Lab Results   Component Value Date    HGB 12.4 2014    HCT 37.8 2014

## 2018-12-10 RX ORDER — LISINOPRIL 5 MG/1
TABLET ORAL
Qty: 90 TABLET | Refills: 3 | Status: SHIPPED | OUTPATIENT
Start: 2018-12-10 | End: 2019-12-17 | Stop reason: SDUPTHER

## 2018-12-12 RX ORDER — FENOFIBRATE 160 MG/1
TABLET ORAL
Qty: 90 TABLET | Refills: 3 | Status: SHIPPED | OUTPATIENT
Start: 2018-12-12 | End: 2019-12-10 | Stop reason: SDUPTHER

## 2019-03-29 RX ORDER — RANOLAZINE 500 MG/1
TABLET, FILM COATED, EXTENDED RELEASE ORAL
Qty: 180 TABLET | Refills: 2 | Status: SHIPPED | OUTPATIENT
Start: 2019-03-29 | End: 2019-04-19 | Stop reason: SDUPTHER

## 2019-04-01 RX ORDER — CLOPIDOGREL BISULFATE 75 MG/1
TABLET ORAL
Qty: 30 TABLET | Refills: 12 | Status: SHIPPED | OUTPATIENT
Start: 2019-04-01 | End: 2020-04-06

## 2019-04-18 ENCOUNTER — TELEPHONE (OUTPATIENT)
Dept: CARDIOLOGY CLINIC | Age: 69
End: 2019-04-18

## 2019-04-18 NOTE — TELEPHONE ENCOUNTER
[facility] is calling stating that Jazmin Garcia started on mxHero and it broke her out in a rash all over her body. Wants to know if there is another drug to start her on?     [facility] can be reached at [phone]

## 2019-04-18 NOTE — TELEPHONE ENCOUNTER
Called patient [phone]. Patient said that [facility] pharmacy switch from Ranexa to generic she has rash and welts all over and lips swelling. Do you want me to start a PA for name brand or switch to something else? Patient did stop medication and knows not to take anymore. She is already taking benadryl for symptoms.

## 2019-04-18 NOTE — TELEPHONE ENCOUNTER
Called [facility] [phone]. Spoke to Pharmacist.  Requested that she send PA to office to complete.

## 2019-04-25 RX ORDER — RANOLAZINE 500 MG/1
TABLET, FILM COATED, EXTENDED RELEASE ORAL
Qty: 180 TABLET | Refills: 2 | Status: SHIPPED | OUTPATIENT
Start: 2019-04-25 | End: 2020-01-10

## 2019-05-01 DIAGNOSIS — E78.01 FAMILIAL HYPERCHOLESTEROLEMIA: ICD-10-CM

## 2019-05-01 DIAGNOSIS — I25.10 CORONARY ARTERY DISEASE INVOLVING NATIVE CORONARY ARTERY OF NATIVE HEART WITHOUT ANGINA PECTORIS: Primary | ICD-10-CM

## 2019-05-06 DIAGNOSIS — E78.01 FAMILIAL HYPERCHOLESTEROLEMIA: ICD-10-CM

## 2019-05-06 DIAGNOSIS — I25.10 CORONARY ARTERY DISEASE INVOLVING NATIVE CORONARY ARTERY OF NATIVE HEART WITHOUT ANGINA PECTORIS: ICD-10-CM

## 2019-05-06 LAB
A/G RATIO: 1.3 (ref 1.1–2.2)
ALBUMIN SERPL-MCNC: 4 G/DL (ref 3.4–5)
ALP BLD-CCNC: 71 U/L (ref 40–129)
ALT SERPL-CCNC: 19 U/L (ref 10–40)
ANION GAP SERPL CALCULATED.3IONS-SCNC: 16 MMOL/L (ref 3–16)
AST SERPL-CCNC: 18 U/L (ref 15–37)
BILIRUB SERPL-MCNC: <0.2 MG/DL (ref 0–1)
BUN BLDV-MCNC: 15 MG/DL (ref 7–20)
CALCIUM SERPL-MCNC: 10 MG/DL (ref 8.3–10.6)
CHLORIDE BLD-SCNC: 100 MMOL/L (ref 99–110)
CHOLESTEROL, FASTING: 145 MG/DL (ref 0–199)
CO2: 24 MMOL/L (ref 21–32)
CREAT SERPL-MCNC: 0.8 MG/DL (ref 0.6–1.2)
GFR AFRICAN AMERICAN: >60
GFR NON-AFRICAN AMERICAN: >60
GLOBULIN: 3 G/DL
GLUCOSE FASTING: 231 MG/DL (ref 70–99)
HDLC SERPL-MCNC: 23 MG/DL (ref 40–60)
LDL CHOLESTEROL CALCULATED: ABNORMAL MG/DL
LDL CHOLESTEROL DIRECT: 64 MG/DL
POTASSIUM SERPL-SCNC: 4.9 MMOL/L (ref 3.5–5.1)
SODIUM BLD-SCNC: 140 MMOL/L (ref 136–145)
TOTAL PROTEIN: 7 G/DL (ref 6.4–8.2)
TRIGLYCERIDE, FASTING: 543 MG/DL (ref 0–150)
VLDLC SERPL CALC-MCNC: ABNORMAL MG/DL

## 2019-05-13 NOTE — PROGRESS NOTES
[facility]                     Cardiology Progress Note    Elfego Garibay      May 15, 2019     CC: \"I am feeling okay. \"     HPI:  The patient is 76 y.o. female with a past medical history significant for hypertension, hyperlipidemia, diabetes, CAD s/p stent placement, s/p CABG 2011, depression s/p CABG. Today, she is here for her CAD management. She says that she is doing well except for elevated triglycerides. She has been under a lot of stress. She says that her and her  are going to start going to silver sneakers soon. Patient denies exertional chest pain/pressure, dyspnea at rest, CASSIDY, PND, orthopnea, palpitations, lightheadedness, weight changes, changes in LE edema, and syncope. Patient reports compliance to her medications.     Past Medical History:   Diagnosis Date    CAD (coronary artery disease)     Diabetes mellitus ([address]     Hyperlipidemia     Hypertension     Kidney calculi     lithitripsy x2 2009 dec    Pneumonia 2009    Vegetarian diet      Past Surgical History:   Procedure Laterality Date    APPENDECTOMY      BACK SURGERY       SECTION      CHOLECYSTECTOMY      CORONARY ANGIOPLASTY WITH STENT PLACEMENT      LITHOTRIPSY      TONSILLECTOMY      UPPER GASTROINTESTINAL ENDOSCOPY       Family History   Problem Relation Age of Onset    Other Mother     Other Father     Diabetes Maternal Grandmother      Social History     Tobacco Use    Smoking status: Never Smoker    Smokeless tobacco: Never Used   Substance Use Topics    Alcohol use: No    Drug use: No       Allergies   Allergen Reactions    Effient [Prasugrel] Swelling     Swelling in tongue, itching    Azithromycin      Thrush on tongue    Dilaudid [Hydromorphone Hcl]      Severe head pain and ear pressure, very weak    Dolobid [Diflunisal] Nausea Only    Doxycycline      n  & v      Ranolazine Er Hives, Itching and Swelling     Can only take name brand Ranexa  Can not take generic    Vibramycin [Doxycycline Calcium]      Severe nausea and vomiting    Morphine Sulfate Nausea And Vomiting     Review of Systems:  Review of systems is as detailed above and all other systems are normal.      Physical Exam:   /74   Pulse 68   Ht 5' 3\" (1.6 m)   Wt 168 lb (76.2 kg) Comment: did not wish to remove shoes  SpO2 97%   BMI 29.76 kg/m²   Wt Readings from Last 3 Encounters:   05/15/19 168 lb (76.2 kg)   11/12/18 165 lb (74.8 kg)   07/30/18 166 lb (75.3 kg)     Constitutional: She is oriented to person, place, and time. She appears well-developed and well-nourished. In no acute distress. Head: Normocephalic and atraumatic. Pupils equal and round. Neck: Neck supple. No JVP or carotid bruit appreciated. No mass and no thyromegaly present. No lymphadenopathy present. Cardiovascular: Normal rate. Normal heart sounds. Exam reveals no gallop and no friction rub. No murmur heard. Pulmonary/Chest: Effort normal and breath sounds normal. No respiratory distress. She has no wheezes, rhonchi or rales. Abdominal: Soft, non-tender. Bowel sounds are normal. She exhibits no organomegaly, mass or bruit. Extremities: No edema, cyanosis, or clubbing. Pulses are 2+ radial/dorsalis pedis/posterior tibial/carotid bilaterally. Neurological: No gross cranial nerve deficit. Coordination normal.   Skin: Skin is warm and dry. There is no rash or diaphoresis. Psychiatric: She has a normal mood and affect.  Her speech is normal and behavior is normal.   Current Outpatient Medications   Medication Sig Dispense Refill    RANEXA 500 MG extended release tablet TAKE 1 TABLET BY MOUTH TWICE A  tablet 2    clopidogrel (PLAVIX) 75 MG tablet TAKE 1 TABLET EVERY DAY 30 tablet 12    fenofibrate 160 MG tablet TAKE 1 TABLET EVERY DAY 90 tablet 3    lisinopril (PRINIVIL;ZESTRIL) 5 MG tablet TAKE 1 TABLET BY MOUTH EVERY DAY 90 tablet 3    rosuvastatin (CRESTOR) 20 MG tablet Take 1 tablet by mouth daily 30 tablet 12    nitroGLYCERIN (NITROSTAT) 0.4 MG SL tablet PLACE 1 TABLET UNDER THE TONGUE EVERY 5 MINUTES AS NEEDED FOR CHEST PAIN 25 tablet 6    niacin 500 MG tablet Take 500 mg by mouth 3 times daily      Handicap Placard MISC by Does not apply route Handicap to  in 5 years for patient's history of CAD. Patient unable to walk more than 200 feet without stopping to rest. 1 each 0    metoprolol (LOPRESSOR) 25 MG tablet Take 1 tablet by mouth 2 times daily. 180 tablet 3    Multiple Vitamins-Minerals (CENTRUM SILVER PO) Take  by mouth daily.  aspirin EC 81 MG EC tablet Take 1 tablet by mouth daily. 30 tablet 3    omeprazole (PRILOSEC) 20 MG capsule Take 20 mg by mouth as needed.  promethazine (PHENERGAN) 25 MG tablet Take 25 mg by mouth every 6 hours as needed for Nausea.  meclizine (ANTIVERT) 25 MG tablet Take 25 mg by mouth 3 times daily as needed.  Omega-3 Fatty Acids (FISH OIL BURP-LESS) 1200 MG CAPS Take 2,400 mg by mouth.  metformin (GLUCOPHAGE) 500 MG tablet Take 500 mg by mouth 4 times daily (before meals and nightly)       cetirizine (ZYRTEC) 10 MG tablet Take 10 mg by mouth as needed. No current facility-administered medications for this visit.       Facility-Administered Medications Ordered in Other Visits   Medication Dose Route Frequency Provider Last Rate Last Dose    aminocaproic acid (AMICAR) 5 g in sodium chloride 0.9 % 250 mL IV bolus  5 g Intravenous Once Dawson Loyd MD        insulin regular (HUMULIN R;NOVOLIN R) 100 Units in sodium chloride 0.9 % 100 mL infusion  0.1 Units/kg/hr Intravenous Once Dawson Loyd MD        phenylephrine (COLIN-SYNEPHRINE) 50 mg in dextrose 5 % 250 mL infusion  100 mcg/min Intravenous Once Dawson Loyd MD        lactated ringers infusion   Intravenous Once Dawson Lyod MD        pantoprazole (PROTONIX) injection 40 mg  40 mg Intravenous Once Vero Yost MD           Lab Review:     Lab Results Component Value Date    TRIG 285 11/05/2018    HDL 23 05/06/2019    HDL 27 04/18/2012    LDLCALC see below 05/06/2019    LDLDIRECT 64 05/06/2019    LABVLDL see below 05/06/2019    10/27/16  Lab Results   Component Value Date    HGB 12.4 07/16/2014    HCT 37.8 07/16/2014       Lab Results   Component Value Date     05/06/2019      Lab Results   Component Value Date    K 4.9 05/06/2019     Lab Results   Component Value Date    BUN 15 05/06/2019    CREATININE 0.8 05/06/2019   HgbA1c 6.2 11/17/16    EKG interpretation: 1/10 2018-normal sinus rhythm with minor ST and T wave abnormalities     7/16/18-Sinus  Rhythm Nonspecific T-abnormality. Image Review:     CABGx3 4/[facility] at [facility] per Dr. Eda Vitale    Stress:10/10/2013  Summary   Normal myocardial perfusion study. Normal LV size and systolic function. ECG findings reported separately. Cath:3/27/2014  POSTOPERATIVE DIAGNOSES:  1. Stenosis 90% to 95% proximal, 40% to 50% mid and 80% distal right coronary artery stenosis with a competitive flow in the posterolateral and posterior descending artery branches. 2. Patent left main trunk. 3. Mild disease of the proximal left anterior descending and 100% mid left anterior descending occlusion. 4. Stenosis 50% to 60% of the proximal portion of the obtuse marginal branch and 80% stenosis of the mid portion of the obtuse marginal branch. 5. Saphenous vein graft to distal right coronary artery widely patent with mild aneurysmal dilatation of the saphenous vein graft. 6. Patent saphenous vein graft to the diagonal branch with evidence of mild aneurysmal dilatation. 7. Patent left internal mammary artery to the left anterior descending with diffuse disease of the left anterior descending beyond anastomosis. 8. Normal left ventricular systolic function with a _____ of 55% to 60% with normal left heart hemodynamics. 9. Patent femoral and iliac artery.   10. Successful angioplasty of the circumflex artery which [facility], [address] [place] [address]   [facility]  Ph: [phone]  Fax: [phone]

## 2019-05-15 ENCOUNTER — OFFICE VISIT (OUTPATIENT)
Dept: CARDIOLOGY CLINIC | Age: 69
End: 2019-05-15
Payer: COMMERCIAL

## 2019-05-15 VITALS
HEART RATE: 68 BPM | SYSTOLIC BLOOD PRESSURE: 124 MMHG | HEIGHT: 63 IN | DIASTOLIC BLOOD PRESSURE: 74 MMHG | OXYGEN SATURATION: 97 % | BODY MASS INDEX: 29.77 KG/M2 | WEIGHT: 168 LBS

## 2019-05-15 DIAGNOSIS — E78.2 MIXED HYPERLIPIDEMIA: ICD-10-CM

## 2019-05-15 DIAGNOSIS — I10 ESSENTIAL HYPERTENSION: ICD-10-CM

## 2019-05-15 DIAGNOSIS — I25.10 CORONARY ARTERY DISEASE INVOLVING NATIVE CORONARY ARTERY OF NATIVE HEART WITHOUT ANGINA PECTORIS: Primary | ICD-10-CM

## 2019-05-15 PROCEDURE — 99214 OFFICE O/P EST MOD 30 MIN: CPT | Performed by: INTERNAL MEDICINE

## 2019-05-15 NOTE — PATIENT INSTRUCTIONS
Patient Education        Heart-Healthy Diet: Care Instructions  Your Care Instructions    A heart-healthy diet has lots of vegetables, fruits, nuts, beans, and whole grains, and is low in salt. It limits foods that are high in saturated fat, such as meats, cheeses, and fried foods. It may be hard to change your diet, but even small changes can lower your risk of heart attack and heart disease. Follow-up care is a key part of your treatment and safety. Be sure to make and go to all appointments, and call your doctor if you are having problems. It's also a good idea to know your test results and keep a list of the medicines you take. How can you care for yourself at home? Watch your portions  · Learn what a serving is. A \"serving\" and a \"portion\" are not always the same thing. Make sure that you are not eating larger portions than are recommended. For example, a serving of pasta is ½ cup. A serving size of meat is 2 to 3 ounces. A 3-ounce serving is about the size of a deck of cards. Measure serving sizes until you are good at [place]" them. Keep in mind that restaurants often serve portions that are 2 or 3 times the size of one serving. · To keep your energy level up and keep you from feeling hungry, eat often but in smaller portions. · Eat only the number of calories you need to stay at a healthy weight. If you need to lose weight, eat fewer calories than your body burns (through exercise and other physical activity). Eat more fruits and vegetables  · Eat a variety of fruit and vegetables every day. Dark green, deep orange, red, or yellow fruits and vegetables are especially good for you. Examples include spinach, carrots, peaches, and berries. · Keep carrots, celery, and other veggies handy for snacks. Buy fruit that is in season and store it where you can see it so that you will be tempted to eat it. · Cook dishes that have a lot of veggies in them, such as stir-fries and soups.   Limit saturated and [employer], and be sure to contact your doctor if:    · You would like help planning heart-healthy meals. Where can you learn more? Go to https://chpepiceweb.[employer]. org and sign in to your Reclog account. Enter V137 in the [employer] box to learn more about \"Heart-Healthy Diet: Care Instructions. \"     If you do not have an account, please click on the \"Sign Up Now\" link. Current as of: July 22, 2018  Content Version: 12.0  © 3228-1138 [employer]. Care instructions adapted under license by [facility] ([facility]). If you have questions about a medical condition or this instruction, always ask your healthcare professional. [address] any warranty or liability for your use of this information.

## 2019-05-17 DIAGNOSIS — E78.2 MIXED HYPERLIPIDEMIA: ICD-10-CM

## 2019-05-17 LAB
CHOLESTEROL, FASTING: 131 MG/DL (ref 0–199)
HDLC SERPL-MCNC: 24 MG/DL (ref 40–60)
LDL CHOLESTEROL CALCULATED: ABNORMAL MG/DL
LDL CHOLESTEROL DIRECT: 61 MG/DL
TRIGLYCERIDE, FASTING: 388 MG/DL (ref 0–150)
VLDLC SERPL CALC-MCNC: ABNORMAL MG/DL

## 2019-05-21 ENCOUNTER — TELEPHONE (OUTPATIENT)
Dept: CARDIOLOGY CLINIC | Age: 69
End: 2019-05-21

## 2019-05-21 RX ORDER — ICOSAPENT ETHYL 1000 MG/1
2 CAPSULE ORAL 2 TIMES DAILY
Qty: 60 CAPSULE | Refills: 3 | Status: SHIPPED | OUTPATIENT
Start: 2019-05-21 | End: 2019-10-16 | Stop reason: SDUPTHER

## 2019-08-05 RX ORDER — ROSUVASTATIN CALCIUM 20 MG/1
TABLET, COATED ORAL
Qty: 90 TABLET | Refills: 3 | Status: SHIPPED | OUTPATIENT
Start: 2019-08-05 | End: 2020-02-25

## 2019-10-12 ENCOUNTER — APPOINTMENT (OUTPATIENT)
Dept: CT IMAGING | Age: 69
End: 2019-10-12
Payer: MEDICARE

## 2019-10-12 ENCOUNTER — APPOINTMENT (OUTPATIENT)
Dept: MRI IMAGING | Age: 69
End: 2019-10-12
Payer: MEDICARE

## 2019-10-12 ENCOUNTER — HOSPITAL ENCOUNTER (OUTPATIENT)
Age: 69
Setting detail: OBSERVATION
Discharge: HOME | End: 2019-10-14
Attending: EMERGENCY MEDICINE | Admitting: INTERNAL MEDICINE
Payer: MEDICARE

## 2019-10-12 DIAGNOSIS — R11.2 NAUSEA AND VOMITING, INTRACTABILITY OF VOMITING NOT SPECIFIED, UNSPECIFIED VOMITING TYPE: ICD-10-CM

## 2019-10-12 DIAGNOSIS — R42 VERTIGO: Primary | ICD-10-CM

## 2019-10-12 LAB
A/G RATIO: 1.5 (ref 1.1–2.2)
ALBUMIN SERPL-MCNC: 4.3 G/DL (ref 3.4–5)
ALP BLD-CCNC: 59 U/L (ref 40–129)
ALT SERPL-CCNC: 28 U/L (ref 10–40)
ANION GAP SERPL CALCULATED.3IONS-SCNC: 18 MMOL/L (ref 3–16)
AST SERPL-CCNC: 29 U/L (ref 15–37)
BASOPHILS ABSOLUTE: 0.1 K/UL (ref 0–0.2)
BASOPHILS RELATIVE PERCENT: 1.1 %
BILIRUB SERPL-MCNC: 0.3 MG/DL (ref 0–1)
BILIRUBIN URINE: NEGATIVE
BLOOD, URINE: NEGATIVE
BUN BLDV-MCNC: 15 MG/DL (ref 7–20)
CALCIUM SERPL-MCNC: 9.5 MG/DL (ref 8.3–10.6)
CHLORIDE BLD-SCNC: 103 MMOL/L (ref 99–110)
CLARITY: ABNORMAL
CO2: 20 MMOL/L (ref 21–32)
COLOR: YELLOW
CREAT SERPL-MCNC: 0.7 MG/DL (ref 0.6–1.2)
EOSINOPHILS ABSOLUTE: 1 K/UL (ref 0–0.6)
EOSINOPHILS RELATIVE PERCENT: 12.8 %
EPITHELIAL CELLS, UA: 0 /HPF (ref 0–5)
GFR AFRICAN AMERICAN: >60
GFR NON-AFRICAN AMERICAN: >60
GLOBULIN: 2.9 G/DL
GLUCOSE BLD-MCNC: 179 MG/DL (ref 70–99)
GLUCOSE BLD-MCNC: 199 MG/DL (ref 70–99)
GLUCOSE BLD-MCNC: 212 MG/DL (ref 70–99)
GLUCOSE URINE: NEGATIVE MG/DL
HCT VFR BLD CALC: 40.1 % (ref 36–48)
HEMOGLOBIN: 12.9 G/DL (ref 12–16)
HYALINE CASTS: 1 /LPF (ref 0–8)
KETONES, URINE: NEGATIVE MG/DL
LACTIC ACID: 3.3 MMOL/L (ref 0.4–2)
LEUKOCYTE ESTERASE, URINE: ABNORMAL
LYMPHOCYTES ABSOLUTE: 1.5 K/UL (ref 1–5.1)
LYMPHOCYTES RELATIVE PERCENT: 18.8 %
MCH RBC QN AUTO: 26.6 PG (ref 26–34)
MCHC RBC AUTO-ENTMCNC: 32.2 G/DL (ref 31–36)
MCV RBC AUTO: 82.7 FL (ref 80–100)
MICROSCOPIC EXAMINATION: YES
MONOCYTES ABSOLUTE: 0.5 K/UL (ref 0–1.3)
MONOCYTES RELATIVE PERCENT: 7.1 %
NEUTROPHILS ABSOLUTE: 4.7 K/UL (ref 1.7–7.7)
NEUTROPHILS RELATIVE PERCENT: 60.2 %
NITRITE, URINE: NEGATIVE
PDW BLD-RTO: 14.7 % (ref 12.4–15.4)
PERFORMED ON: ABNORMAL
PERFORMED ON: ABNORMAL
PH UA: 6.5 (ref 5–8)
PLATELET # BLD: 187 K/UL (ref 135–450)
PMV BLD AUTO: 9.2 FL (ref 5–10.5)
POTASSIUM REFLEX MAGNESIUM: 4.2 MMOL/L (ref 3.5–5.1)
PROTEIN UA: NEGATIVE MG/DL
RBC # BLD: 4.86 M/UL (ref 4–5.2)
RBC UA: 0 /HPF (ref 0–4)
SODIUM BLD-SCNC: 141 MMOL/L (ref 136–145)
SPECIFIC GRAVITY UA: 1.01 (ref 1–1.03)
TOTAL PROTEIN: 7.2 G/DL (ref 6.4–8.2)
TROPONIN: <0.01 NG/ML
URINE REFLEX TO CULTURE: YES
URINE TYPE: ABNORMAL
UROBILINOGEN, URINE: 0.2 E.U./DL
WBC # BLD: 7.8 K/UL (ref 4–11)
WBC UA: 6 /HPF (ref 0–5)

## 2019-10-12 PROCEDURE — 80053 COMPREHEN METABOLIC PANEL: CPT

## 2019-10-12 PROCEDURE — 85025 COMPLETE CBC W/AUTO DIFF WBC: CPT

## 2019-10-12 PROCEDURE — 96361 HYDRATE IV INFUSION ADD-ON: CPT

## 2019-10-12 PROCEDURE — 70450 CT HEAD/BRAIN W/O DYE: CPT

## 2019-10-12 PROCEDURE — 6370000000 HC RX 637 (ALT 250 FOR IP): Performed by: INTERNAL MEDICINE

## 2019-10-12 PROCEDURE — 96365 THER/PROPH/DIAG IV INF INIT: CPT

## 2019-10-12 PROCEDURE — 83605 ASSAY OF LACTIC ACID: CPT

## 2019-10-12 PROCEDURE — 96372 THER/PROPH/DIAG INJ SC/IM: CPT

## 2019-10-12 PROCEDURE — 2580000003 HC RX 258: Performed by: EMERGENCY MEDICINE

## 2019-10-12 PROCEDURE — 36415 COLL VENOUS BLD VENIPUNCTURE: CPT

## 2019-10-12 PROCEDURE — 6370000000 HC RX 637 (ALT 250 FOR IP): Performed by: EMERGENCY MEDICINE

## 2019-10-12 PROCEDURE — 81001 URINALYSIS AUTO W/SCOPE: CPT

## 2019-10-12 PROCEDURE — 96366 THER/PROPH/DIAG IV INF ADDON: CPT

## 2019-10-12 PROCEDURE — 93005 ELECTROCARDIOGRAM TRACING: CPT | Performed by: EMERGENCY MEDICINE

## 2019-10-12 PROCEDURE — 99285 EMERGENCY DEPT VISIT HI MDM: CPT

## 2019-10-12 PROCEDURE — 87086 URINE CULTURE/COLONY COUNT: CPT

## 2019-10-12 PROCEDURE — 6360000002 HC RX W HCPCS: Performed by: INTERNAL MEDICINE

## 2019-10-12 PROCEDURE — 94760 N-INVAS EAR/PLS OXIMETRY 1: CPT

## 2019-10-12 PROCEDURE — G0378 HOSPITAL OBSERVATION PER HR: HCPCS

## 2019-10-12 PROCEDURE — 84484 ASSAY OF TROPONIN QUANT: CPT

## 2019-10-12 PROCEDURE — 6360000002 HC RX W HCPCS: Performed by: EMERGENCY MEDICINE

## 2019-10-12 PROCEDURE — 2580000003 HC RX 258: Performed by: INTERNAL MEDICINE

## 2019-10-12 PROCEDURE — 70551 MRI BRAIN STEM W/O DYE: CPT

## 2019-10-12 RX ORDER — MECLIZINE HCL 12.5 MG/1
25 TABLET ORAL 3 TIMES DAILY
Status: DISCONTINUED | OUTPATIENT
Start: 2019-10-12 | End: 2019-10-14 | Stop reason: HOSPADM

## 2019-10-12 RX ORDER — PANTOPRAZOLE SODIUM 40 MG/1
40 TABLET, DELAYED RELEASE ORAL
Status: DISCONTINUED | OUTPATIENT
Start: 2019-10-13 | End: 2019-10-14 | Stop reason: HOSPADM

## 2019-10-12 RX ORDER — RANOLAZINE 500 MG/1
500 TABLET, EXTENDED RELEASE ORAL 2 TIMES DAILY
Status: DISCONTINUED | OUTPATIENT
Start: 2019-10-12 | End: 2019-10-14 | Stop reason: HOSPADM

## 2019-10-12 RX ORDER — ATORVASTATIN CALCIUM 40 MG/1
40 TABLET, FILM COATED ORAL NIGHTLY
Status: DISCONTINUED | OUTPATIENT
Start: 2019-10-12 | End: 2019-10-12

## 2019-10-12 RX ORDER — ROSUVASTATIN CALCIUM 10 MG/1
20 TABLET, COATED ORAL DAILY
Status: DISCONTINUED | OUTPATIENT
Start: 2019-10-13 | End: 2019-10-14 | Stop reason: HOSPADM

## 2019-10-12 RX ORDER — NIACIN 500 MG/1
500 TABLET, EXTENDED RELEASE ORAL NIGHTLY
Status: DISCONTINUED | OUTPATIENT
Start: 2019-10-12 | End: 2019-10-14 | Stop reason: HOSPADM

## 2019-10-12 RX ORDER — OMEPRAZOLE 20 MG/1
20 CAPSULE, DELAYED RELEASE ORAL EVERY MORNING
Status: DISCONTINUED | OUTPATIENT
Start: 2019-10-13 | End: 2019-10-12 | Stop reason: CLARIF

## 2019-10-12 RX ORDER — LISINOPRIL 5 MG/1
5 TABLET ORAL DAILY
Status: DISCONTINUED | OUTPATIENT
Start: 2019-10-13 | End: 2019-10-14 | Stop reason: HOSPADM

## 2019-10-12 RX ORDER — DEXTROSE MONOHYDRATE 25 G/50ML
12.5 INJECTION, SOLUTION INTRAVENOUS PRN
Status: DISCONTINUED | OUTPATIENT
Start: 2019-10-12 | End: 2019-10-14 | Stop reason: HOSPADM

## 2019-10-12 RX ORDER — OMEGA-3S/DHA/EPA/FISH OIL/D3 300MG-1000
2400 CAPSULE ORAL DAILY
Status: DISCONTINUED | OUTPATIENT
Start: 2019-10-12 | End: 2019-10-12

## 2019-10-12 RX ORDER — CLOPIDOGREL BISULFATE 75 MG/1
75 TABLET ORAL DAILY
Status: DISCONTINUED | OUTPATIENT
Start: 2019-10-13 | End: 2019-10-14 | Stop reason: HOSPADM

## 2019-10-12 RX ORDER — ONDANSETRON 2 MG/ML
4 INJECTION INTRAMUSCULAR; INTRAVENOUS EVERY 6 HOURS PRN
Status: DISCONTINUED | OUTPATIENT
Start: 2019-10-12 | End: 2019-10-14 | Stop reason: HOSPADM

## 2019-10-12 RX ORDER — SODIUM CHLORIDE 0.9 % (FLUSH) 0.9 %
10 SYRINGE (ML) INJECTION EVERY 12 HOURS SCHEDULED
Status: DISCONTINUED | OUTPATIENT
Start: 2019-10-12 | End: 2019-10-14 | Stop reason: HOSPADM

## 2019-10-12 RX ORDER — ASPIRIN 81 MG/1
81 TABLET ORAL DAILY
Status: DISCONTINUED | OUTPATIENT
Start: 2019-10-12 | End: 2019-10-14 | Stop reason: HOSPADM

## 2019-10-12 RX ORDER — ASPIRIN 300 MG/1
300 SUPPOSITORY RECTAL DAILY
Status: DISCONTINUED | OUTPATIENT
Start: 2019-10-12 | End: 2019-10-14 | Stop reason: HOSPADM

## 2019-10-12 RX ORDER — FENOFIBRATE 160 MG/1
160 TABLET ORAL DAILY
Status: DISCONTINUED | OUTPATIENT
Start: 2019-10-13 | End: 2019-10-14 | Stop reason: HOSPADM

## 2019-10-12 RX ORDER — NICOTINE POLACRILEX 4 MG
15 LOZENGE BUCCAL PRN
Status: DISCONTINUED | OUTPATIENT
Start: 2019-10-12 | End: 2019-10-14 | Stop reason: HOSPADM

## 2019-10-12 RX ORDER — 0.9 % SODIUM CHLORIDE 0.9 %
1000 INTRAVENOUS SOLUTION INTRAVENOUS ONCE
Status: COMPLETED | OUTPATIENT
Start: 2019-10-12 | End: 2019-10-12

## 2019-10-12 RX ORDER — MECLIZINE HCL 12.5 MG/1
25 TABLET ORAL ONCE
Status: COMPLETED | OUTPATIENT
Start: 2019-10-12 | End: 2019-10-12

## 2019-10-12 RX ORDER — PROMETHAZINE HYDROCHLORIDE 25 MG/1
25 TABLET ORAL EVERY 6 HOURS PRN
Status: DISCONTINUED | OUTPATIENT
Start: 2019-10-12 | End: 2019-10-14 | Stop reason: HOSPADM

## 2019-10-12 RX ORDER — ASPIRIN 81 MG/1
81 TABLET ORAL DAILY
Status: DISCONTINUED | OUTPATIENT
Start: 2019-10-12 | End: 2019-10-12 | Stop reason: SDUPTHER

## 2019-10-12 RX ORDER — PROMETHAZINE HYDROCHLORIDE 25 MG/ML
12.5 INJECTION, SOLUTION INTRAMUSCULAR; INTRAVENOUS EVERY 6 HOURS PRN
Status: DISCONTINUED | OUTPATIENT
Start: 2019-10-12 | End: 2019-10-12 | Stop reason: CLARIF

## 2019-10-12 RX ORDER — NITROGLYCERIN 0.4 MG/1
0.4 TABLET SUBLINGUAL EVERY 5 MIN PRN
Status: DISCONTINUED | OUTPATIENT
Start: 2019-10-12 | End: 2019-10-14 | Stop reason: HOSPADM

## 2019-10-12 RX ORDER — DEXTROSE MONOHYDRATE 50 MG/ML
100 INJECTION, SOLUTION INTRAVENOUS PRN
Status: DISCONTINUED | OUTPATIENT
Start: 2019-10-12 | End: 2019-10-14 | Stop reason: HOSPADM

## 2019-10-12 RX ORDER — SODIUM CHLORIDE 0.9 % (FLUSH) 0.9 %
10 SYRINGE (ML) INJECTION PRN
Status: DISCONTINUED | OUTPATIENT
Start: 2019-10-12 | End: 2019-10-14 | Stop reason: HOSPADM

## 2019-10-12 RX ORDER — ICOSAPENT ETHYL 1000 MG/1
2 CAPSULE ORAL 2 TIMES DAILY
Status: DISCONTINUED | OUTPATIENT
Start: 2019-10-12 | End: 2019-10-14 | Stop reason: HOSPADM

## 2019-10-12 RX ADMIN — ENOXAPARIN SODIUM 40 MG: 40 INJECTION SUBCUTANEOUS at 21:39

## 2019-10-12 RX ADMIN — MECLIZINE 25 MG: 12.5 TABLET ORAL at 15:50

## 2019-10-12 RX ADMIN — Medication 12.5 MG: at 15:50

## 2019-10-12 RX ADMIN — MECLIZINE 25 MG: 12.5 TABLET ORAL at 21:39

## 2019-10-12 RX ADMIN — SODIUM CHLORIDE 1000 ML: 9 INJECTION, SOLUTION INTRAVENOUS at 09:07

## 2019-10-12 RX ADMIN — Medication 10 ML: at 21:39

## 2019-10-12 RX ADMIN — NIACIN 500 MG: 500 TABLET, EXTENDED RELEASE ORAL at 21:39

## 2019-10-12 RX ADMIN — MECLIZINE 25 MG: 12.5 TABLET ORAL at 09:08

## 2019-10-12 RX ADMIN — METOPROLOL TARTRATE 25 MG: 25 TABLET ORAL at 21:39

## 2019-10-12 RX ADMIN — ASPIRIN 81 MG: 81 TABLET, COATED ORAL at 15:50

## 2019-10-12 RX ADMIN — Medication 25 MG: at 09:08

## 2019-10-12 ASSESSMENT — PAIN DESCRIPTION - PROGRESSION
CLINICAL_PROGRESSION: NOT CHANGED
CLINICAL_PROGRESSION: GRADUALLY IMPROVING

## 2019-10-12 ASSESSMENT — PAIN DESCRIPTION - ONSET
ONSET: ON-GOING
ONSET: GRADUAL

## 2019-10-12 ASSESSMENT — PAIN - FUNCTIONAL ASSESSMENT: PAIN_FUNCTIONAL_ASSESSMENT: ACTIVITIES ARE NOT PREVENTED

## 2019-10-12 ASSESSMENT — PAIN DESCRIPTION - FREQUENCY
FREQUENCY: CONTINUOUS
FREQUENCY: INTERMITTENT

## 2019-10-12 ASSESSMENT — PAIN SCALES - GENERAL
PAINLEVEL_OUTOF10: 0
PAINLEVEL_OUTOF10: 0
PAINLEVEL_OUTOF10: 8
PAINLEVEL_OUTOF10: 5
PAINLEVEL_OUTOF10: 0

## 2019-10-12 ASSESSMENT — PAIN DESCRIPTION - DESCRIPTORS
DESCRIPTORS: ACHING
DESCRIPTORS: ACHING

## 2019-10-12 ASSESSMENT — PAIN DESCRIPTION - LOCATION
LOCATION: HEAD
LOCATION: HEAD

## 2019-10-12 ASSESSMENT — PAIN DESCRIPTION - PAIN TYPE
TYPE: ACUTE PAIN
TYPE: ACUTE PAIN

## 2019-10-13 LAB
ANION GAP SERPL CALCULATED.3IONS-SCNC: 20 MMOL/L (ref 3–16)
BUN BLDV-MCNC: 14 MG/DL (ref 7–20)
CALCIUM SERPL-MCNC: 9.5 MG/DL (ref 8.3–10.6)
CHLORIDE BLD-SCNC: 106 MMOL/L (ref 99–110)
CHOLESTEROL, TOTAL: 141 MG/DL (ref 0–199)
CO2: 17 MMOL/L (ref 21–32)
CREAT SERPL-MCNC: 0.7 MG/DL (ref 0.6–1.2)
EKG ATRIAL RATE: 79 BPM
EKG DIAGNOSIS: NORMAL
EKG P AXIS: 59 DEGREES
EKG P-R INTERVAL: 124 MS
EKG Q-T INTERVAL: 410 MS
EKG QRS DURATION: 84 MS
EKG QTC CALCULATION (BAZETT): 470 MS
EKG R AXIS: 29 DEGREES
EKG T AXIS: 39 DEGREES
EKG VENTRICULAR RATE: 79 BPM
ESTIMATED AVERAGE GLUCOSE: 211.6 MG/DL
GFR AFRICAN AMERICAN: >60
GFR NON-AFRICAN AMERICAN: >60
GLUCOSE BLD-MCNC: 193 MG/DL (ref 70–99)
GLUCOSE BLD-MCNC: 195 MG/DL (ref 70–99)
GLUCOSE BLD-MCNC: 219 MG/DL (ref 70–99)
GLUCOSE BLD-MCNC: 225 MG/DL (ref 70–99)
HBA1C MFR BLD: 9 %
HCT VFR BLD CALC: 39.2 % (ref 36–48)
HDLC SERPL-MCNC: 23 MG/DL (ref 40–60)
HEMOGLOBIN: 12.7 G/DL (ref 12–16)
LACTIC ACID: 1.7 MMOL/L (ref 0.4–2)
LDL CHOLESTEROL CALCULATED: ABNORMAL MG/DL
LDL CHOLESTEROL DIRECT: 55 MG/DL
MCH RBC QN AUTO: 26.6 PG (ref 26–34)
MCHC RBC AUTO-ENTMCNC: 32.4 G/DL (ref 31–36)
MCV RBC AUTO: 82.1 FL (ref 80–100)
PDW BLD-RTO: 14.5 % (ref 12.4–15.4)
PERFORMED ON: ABNORMAL
PLATELET # BLD: 223 K/UL (ref 135–450)
PMV BLD AUTO: 9.2 FL (ref 5–10.5)
POTASSIUM REFLEX MAGNESIUM: 4.1 MMOL/L (ref 3.5–5.1)
RBC # BLD: 4.77 M/UL (ref 4–5.2)
SODIUM BLD-SCNC: 143 MMOL/L (ref 136–145)
TRIGL SERPL-MCNC: 702 MG/DL (ref 0–150)
URINE CULTURE, ROUTINE: NORMAL
VLDLC SERPL CALC-MCNC: ABNORMAL MG/DL
WBC # BLD: 7.1 K/UL (ref 4–11)

## 2019-10-13 PROCEDURE — 36415 COLL VENOUS BLD VENIPUNCTURE: CPT

## 2019-10-13 PROCEDURE — 80048 BASIC METABOLIC PNL TOTAL CA: CPT

## 2019-10-13 PROCEDURE — 83605 ASSAY OF LACTIC ACID: CPT

## 2019-10-13 PROCEDURE — 83721 ASSAY OF BLOOD LIPOPROTEIN: CPT

## 2019-10-13 PROCEDURE — 85027 COMPLETE CBC AUTOMATED: CPT

## 2019-10-13 PROCEDURE — 2580000003 HC RX 258: Performed by: INTERNAL MEDICINE

## 2019-10-13 PROCEDURE — 6370000000 HC RX 637 (ALT 250 FOR IP): Performed by: INTERNAL MEDICINE

## 2019-10-13 PROCEDURE — 83036 HEMOGLOBIN GLYCOSYLATED A1C: CPT

## 2019-10-13 PROCEDURE — 99214 OFFICE O/P EST MOD 30 MIN: CPT | Performed by: INTERNAL MEDICINE

## 2019-10-13 PROCEDURE — 96372 THER/PROPH/DIAG INJ SC/IM: CPT

## 2019-10-13 PROCEDURE — G0378 HOSPITAL OBSERVATION PER HR: HCPCS

## 2019-10-13 PROCEDURE — 93010 ELECTROCARDIOGRAM REPORT: CPT | Performed by: INTERNAL MEDICINE

## 2019-10-13 PROCEDURE — 92523 SPEECH SOUND LANG COMPREHEN: CPT

## 2019-10-13 PROCEDURE — 6360000002 HC RX W HCPCS: Performed by: INTERNAL MEDICINE

## 2019-10-13 PROCEDURE — 94760 N-INVAS EAR/PLS OXIMETRY 1: CPT

## 2019-10-13 PROCEDURE — 97127 HC SP THER IVNTJ W/FOCUS COG FUNCJ: CPT

## 2019-10-13 PROCEDURE — 80061 LIPID PANEL: CPT

## 2019-10-13 RX ADMIN — LISINOPRIL 5 MG: 5 TABLET ORAL at 21:23

## 2019-10-13 RX ADMIN — MECLIZINE 25 MG: 12.5 TABLET ORAL at 13:43

## 2019-10-13 RX ADMIN — MECLIZINE 25 MG: 12.5 TABLET ORAL at 08:58

## 2019-10-13 RX ADMIN — FENOFIBRATE 160 MG: 160 TABLET ORAL at 08:58

## 2019-10-13 RX ADMIN — PANTOPRAZOLE SODIUM 40 MG: 40 TABLET, DELAYED RELEASE ORAL at 06:20

## 2019-10-13 RX ADMIN — CLOPIDOGREL BISULFATE 75 MG: 75 TABLET ORAL at 08:58

## 2019-10-13 RX ADMIN — Medication 10 ML: at 21:21

## 2019-10-13 RX ADMIN — NIACIN 500 MG: 500 TABLET, EXTENDED RELEASE ORAL at 21:20

## 2019-10-13 RX ADMIN — METOPROLOL TARTRATE 25 MG: 25 TABLET ORAL at 08:58

## 2019-10-13 RX ADMIN — METOPROLOL TARTRATE 25 MG: 25 TABLET ORAL at 21:20

## 2019-10-13 RX ADMIN — ENOXAPARIN SODIUM 40 MG: 40 INJECTION SUBCUTANEOUS at 21:21

## 2019-10-13 RX ADMIN — MECLIZINE 25 MG: 12.5 TABLET ORAL at 21:20

## 2019-10-13 RX ADMIN — Medication 10 ML: at 08:58

## 2019-10-13 RX ADMIN — ROSUVASTATIN CALCIUM 20 MG: 10 TABLET, FILM COATED ORAL at 08:58

## 2019-10-13 RX ADMIN — ASPIRIN 81 MG: 81 TABLET, COATED ORAL at 08:58

## 2019-10-13 ASSESSMENT — PAIN SCALES - GENERAL
PAINLEVEL_OUTOF10: 0
PAINLEVEL_OUTOF10: 0

## 2019-10-14 VITALS
HEART RATE: 61 BPM | OXYGEN SATURATION: 97 % | DIASTOLIC BLOOD PRESSURE: 59 MMHG | RESPIRATION RATE: 16 BRPM | TEMPERATURE: 97.8 F | BODY MASS INDEX: 29.61 KG/M2 | HEIGHT: 63 IN | SYSTOLIC BLOOD PRESSURE: 148 MMHG | WEIGHT: 167.11 LBS

## 2019-10-14 LAB
ANION GAP SERPL CALCULATED.3IONS-SCNC: 15 MMOL/L (ref 3–16)
BUN BLDV-MCNC: 15 MG/DL (ref 7–20)
CALCIUM SERPL-MCNC: 10 MG/DL (ref 8.3–10.6)
CHLORIDE BLD-SCNC: 101 MMOL/L (ref 99–110)
CO2: 21 MMOL/L (ref 21–32)
CREAT SERPL-MCNC: 0.7 MG/DL (ref 0.6–1.2)
GFR AFRICAN AMERICAN: >60
GFR NON-AFRICAN AMERICAN: >60
GLUCOSE BLD-MCNC: 205 MG/DL (ref 70–99)
GLUCOSE BLD-MCNC: 269 MG/DL (ref 70–99)
GLUCOSE BLD-MCNC: 291 MG/DL (ref 70–99)
PERFORMED ON: ABNORMAL
PERFORMED ON: ABNORMAL
POTASSIUM SERPL-SCNC: 4 MMOL/L (ref 3.5–5.1)
SODIUM BLD-SCNC: 137 MMOL/L (ref 136–145)

## 2019-10-14 PROCEDURE — 6370000000 HC RX 637 (ALT 250 FOR IP): Performed by: INTERNAL MEDICINE

## 2019-10-14 PROCEDURE — 6370000000 HC RX 637 (ALT 250 FOR IP): Performed by: HOSPITALIST

## 2019-10-14 PROCEDURE — 36415 COLL VENOUS BLD VENIPUNCTURE: CPT

## 2019-10-14 PROCEDURE — 2580000003 HC RX 258: Performed by: INTERNAL MEDICINE

## 2019-10-14 PROCEDURE — G0378 HOSPITAL OBSERVATION PER HR: HCPCS

## 2019-10-14 PROCEDURE — 94760 N-INVAS EAR/PLS OXIMETRY 1: CPT

## 2019-10-14 PROCEDURE — 80048 BASIC METABOLIC PNL TOTAL CA: CPT

## 2019-10-14 RX ORDER — ZOLPIDEM TARTRATE 5 MG/1
5 TABLET ORAL ONCE
Status: DISCONTINUED | OUTPATIENT
Start: 2019-10-14 | End: 2019-10-14 | Stop reason: HOSPADM

## 2019-10-14 RX ORDER — INSULIN GLARGINE 100 [IU]/ML
15 INJECTION, SOLUTION SUBCUTANEOUS DAILY
Qty: 1 VIAL | Refills: 3 | Status: SHIPPED | OUTPATIENT
Start: 2019-10-14 | End: 2019-11-19

## 2019-10-14 RX ORDER — INSULIN GLARGINE 100 [IU]/ML
15 INJECTION, SOLUTION SUBCUTANEOUS DAILY
Status: DISCONTINUED | OUTPATIENT
Start: 2019-10-14 | End: 2019-10-14 | Stop reason: HOSPADM

## 2019-10-14 RX ORDER — ACETAMINOPHEN 325 MG/1
650 TABLET ORAL EVERY 4 HOURS PRN
Status: DISCONTINUED | OUTPATIENT
Start: 2019-10-14 | End: 2019-10-14 | Stop reason: HOSPADM

## 2019-10-14 RX ORDER — IBUPROFEN 200 MG
1 TABLET ORAL DAILY
Qty: 100 EACH | Refills: 3 | Status: SHIPPED | OUTPATIENT
Start: 2019-10-14 | End: 2019-11-19

## 2019-10-14 RX ORDER — GLIMEPIRIDE 1 MG/1
4 TABLET ORAL
Status: DISCONTINUED | OUTPATIENT
Start: 2019-10-14 | End: 2019-10-14

## 2019-10-14 RX ADMIN — ROSUVASTATIN CALCIUM 20 MG: 10 TABLET, FILM COATED ORAL at 08:36

## 2019-10-14 RX ADMIN — FENOFIBRATE 160 MG: 160 TABLET ORAL at 08:36

## 2019-10-14 RX ADMIN — CLOPIDOGREL BISULFATE 75 MG: 75 TABLET ORAL at 08:36

## 2019-10-14 RX ADMIN — MECLIZINE 25 MG: 12.5 TABLET ORAL at 08:36

## 2019-10-14 RX ADMIN — PANTOPRAZOLE SODIUM 40 MG: 40 TABLET, DELAYED RELEASE ORAL at 08:36

## 2019-10-14 RX ADMIN — INSULIN LISPRO 6 UNITS: 100 INJECTION, SOLUTION INTRAVENOUS; SUBCUTANEOUS at 13:05

## 2019-10-14 RX ADMIN — Medication 10 ML: at 08:39

## 2019-10-14 RX ADMIN — LISINOPRIL 5 MG: 5 TABLET ORAL at 08:36

## 2019-10-14 RX ADMIN — ACETAMINOPHEN 650 MG: 325 TABLET ORAL at 03:15

## 2019-10-14 RX ADMIN — MECLIZINE 25 MG: 12.5 TABLET ORAL at 13:05

## 2019-10-14 RX ADMIN — ASPIRIN 81 MG: 81 TABLET, COATED ORAL at 08:36

## 2019-10-14 RX ADMIN — INSULIN GLARGINE 15 UNITS: 100 INJECTION, SOLUTION SUBCUTANEOUS at 14:09

## 2019-10-14 RX ADMIN — METOPROLOL TARTRATE 25 MG: 25 TABLET ORAL at 08:36

## 2019-10-14 ASSESSMENT — PAIN DESCRIPTION - ORIENTATION
ORIENTATION: LOWER;MID
ORIENTATION: LOWER;MID

## 2019-10-14 ASSESSMENT — PAIN DESCRIPTION - PAIN TYPE
TYPE: ACUTE PAIN;CHRONIC PAIN
TYPE: ACUTE PAIN

## 2019-10-14 ASSESSMENT — PAIN SCALES - GENERAL
PAINLEVEL_OUTOF10: 0
PAINLEVEL_OUTOF10: 3
PAINLEVEL_OUTOF10: 0
PAINLEVEL_OUTOF10: 6
PAINLEVEL_OUTOF10: 6
PAINLEVEL_OUTOF10: 0

## 2019-10-14 ASSESSMENT — PAIN DESCRIPTION - LOCATION
LOCATION: BACK
LOCATION: BACK

## 2019-10-14 ASSESSMENT — PAIN DESCRIPTION - ONSET
ONSET: PROGRESSIVE
ONSET: PROGRESSIVE

## 2019-10-14 ASSESSMENT — PAIN DESCRIPTION - DESCRIPTORS
DESCRIPTORS: ACHING
DESCRIPTORS: ACHING

## 2019-10-14 ASSESSMENT — PAIN DESCRIPTION - DIRECTION
RADIATING_TOWARDS: ACROSS
RADIATING_TOWARDS: ACROSS

## 2019-10-14 ASSESSMENT — PAIN DESCRIPTION - FREQUENCY
FREQUENCY: CONTINUOUS
FREQUENCY: INTERMITTENT

## 2019-10-14 ASSESSMENT — PAIN - FUNCTIONAL ASSESSMENT
PAIN_FUNCTIONAL_ASSESSMENT: ACTIVITIES ARE NOT PREVENTED
PAIN_FUNCTIONAL_ASSESSMENT: ACTIVITIES ARE NOT PREVENTED

## 2019-10-14 ASSESSMENT — PAIN DESCRIPTION - PROGRESSION: CLINICAL_PROGRESSION: NOT CHANGED

## 2019-10-16 RX ORDER — ICOSAPENT ETHYL 1000 MG/1
2 CAPSULE ORAL 2 TIMES DAILY
Qty: 60 CAPSULE | Refills: 5 | Status: SHIPPED | OUTPATIENT
Start: 2019-10-16 | End: 2020-10-19

## 2019-11-19 ENCOUNTER — OFFICE VISIT (OUTPATIENT)
Dept: CARDIOLOGY CLINIC | Age: 69
End: 2019-11-19
Payer: MEDICARE

## 2019-11-19 VITALS
OXYGEN SATURATION: 97 % | HEART RATE: 72 BPM | WEIGHT: 163 LBS | SYSTOLIC BLOOD PRESSURE: 128 MMHG | DIASTOLIC BLOOD PRESSURE: 60 MMHG | BODY MASS INDEX: 28.88 KG/M2 | HEIGHT: 63 IN

## 2019-11-19 DIAGNOSIS — I25.10 CORONARY ARTERY DISEASE INVOLVING NATIVE CORONARY ARTERY OF NATIVE HEART WITHOUT ANGINA PECTORIS: Primary | ICD-10-CM

## 2019-11-19 DIAGNOSIS — E78.2 ELEVATED CHOLESTEROL WITH ELEVATED TRIGLYCERIDES: ICD-10-CM

## 2019-11-19 DIAGNOSIS — I63.9 CEREBROVASCULAR ACCIDENT (CVA), UNSPECIFIED MECHANISM (HCC): ICD-10-CM

## 2019-11-19 DIAGNOSIS — I10 ESSENTIAL HYPERTENSION: ICD-10-CM

## 2019-11-19 DIAGNOSIS — E78.2 MIXED HYPERLIPIDEMIA: ICD-10-CM

## 2019-11-19 PROCEDURE — 1090F PRES/ABSN URINE INCON ASSESS: CPT | Performed by: INTERNAL MEDICINE

## 2019-11-19 PROCEDURE — G8400 PT W/DXA NO RESULTS DOC: HCPCS | Performed by: INTERNAL MEDICINE

## 2019-11-19 PROCEDURE — 1036F TOBACCO NON-USER: CPT | Performed by: INTERNAL MEDICINE

## 2019-11-19 PROCEDURE — 99214 OFFICE O/P EST MOD 30 MIN: CPT | Performed by: INTERNAL MEDICINE

## 2019-11-19 PROCEDURE — 3017F COLORECTAL CA SCREEN DOC REV: CPT | Performed by: INTERNAL MEDICINE

## 2019-11-19 PROCEDURE — G8427 DOCREV CUR MEDS BY ELIG CLIN: HCPCS | Performed by: INTERNAL MEDICINE

## 2019-11-19 PROCEDURE — 4040F PNEUMOC VAC/ADMIN/RCVD: CPT | Performed by: INTERNAL MEDICINE

## 2019-11-19 PROCEDURE — 1123F ACP DISCUSS/DSCN MKR DOCD: CPT | Performed by: INTERNAL MEDICINE

## 2019-11-19 PROCEDURE — G8417 CALC BMI ABV UP PARAM F/U: HCPCS | Performed by: INTERNAL MEDICINE

## 2019-11-19 PROCEDURE — G8484 FLU IMMUNIZE NO ADMIN: HCPCS | Performed by: INTERNAL MEDICINE

## 2019-11-19 PROCEDURE — G8598 ASA/ANTIPLAT THER USED: HCPCS | Performed by: INTERNAL MEDICINE

## 2019-11-19 RX ORDER — GLIPIZIDE 5 MG/1
5 TABLET ORAL
COMMUNITY

## 2019-12-10 RX ORDER — FENOFIBRATE 160 MG/1
TABLET ORAL
Qty: 90 TABLET | Refills: 3 | Status: SHIPPED | OUTPATIENT
Start: 2019-12-10 | End: 2020-12-17

## 2019-12-17 RX ORDER — LISINOPRIL 5 MG/1
TABLET ORAL
Qty: 90 TABLET | Refills: 3 | Status: SHIPPED | OUTPATIENT
Start: 2019-12-17 | End: 2021-02-12

## 2020-01-10 RX ORDER — RANOLAZINE 500 MG/1
TABLET, FILM COATED, EXTENDED RELEASE ORAL
Qty: 180 TABLET | Refills: 3 | Status: SHIPPED | OUTPATIENT
Start: 2020-01-10 | End: 2021-01-15

## 2020-01-14 ENCOUNTER — TELEPHONE (OUTPATIENT)
Dept: CARDIOLOGY CLINIC | Age: 70
End: 2020-01-14

## 2020-02-17 DIAGNOSIS — E78.2 MIXED HYPERLIPIDEMIA: ICD-10-CM

## 2020-02-17 DIAGNOSIS — E78.2 ELEVATED CHOLESTEROL WITH ELEVATED TRIGLYCERIDES: ICD-10-CM

## 2020-02-17 LAB
ALBUMIN SERPL-MCNC: 4.7 G/DL (ref 3.4–5)
ALP BLD-CCNC: 70 U/L (ref 40–129)
ALT SERPL-CCNC: 26 U/L (ref 10–40)
AST SERPL-CCNC: 23 U/L (ref 15–37)
BILIRUB SERPL-MCNC: 0.3 MG/DL (ref 0–1)
BILIRUBIN DIRECT: <0.2 MG/DL (ref 0–0.3)
BILIRUBIN, INDIRECT: NORMAL MG/DL (ref 0–1)
CHOLESTEROL, FASTING: 163 MG/DL (ref 0–199)
ESTIMATED AVERAGE GLUCOSE: 159.9 MG/DL
HBA1C MFR BLD: 7.2 %
HDLC SERPL-MCNC: 26 MG/DL (ref 40–60)
LDL CHOLESTEROL CALCULATED: ABNORMAL MG/DL
LDL CHOLESTEROL DIRECT: 97 MG/DL
TOTAL PROTEIN: 7 G/DL (ref 6.4–8.2)
TRIGLYCERIDE, FASTING: 379 MG/DL (ref 0–150)
VLDLC SERPL CALC-MCNC: ABNORMAL MG/DL

## 2020-02-25 ENCOUNTER — TELEPHONE (OUTPATIENT)
Dept: CARDIOLOGY CLINIC | Age: 70
End: 2020-02-25

## 2020-02-25 RX ORDER — ROSUVASTATIN CALCIUM 40 MG/1
40 TABLET, COATED ORAL DAILY
Qty: 30 TABLET | Refills: 5 | Status: SHIPPED | OUTPATIENT
Start: 2020-02-25 | End: 2020-07-27

## 2020-03-02 RX ORDER — NITROGLYCERIN 0.4 MG/1
0.4 TABLET SUBLINGUAL EVERY 5 MIN PRN
Qty: 25 TABLET | Refills: 6 | Status: SHIPPED | OUTPATIENT
Start: 2020-03-02 | End: 2022-02-07 | Stop reason: SDUPTHER

## 2020-04-06 RX ORDER — CLOPIDOGREL BISULFATE 75 MG/1
TABLET ORAL
Qty: 90 TABLET | Refills: 3 | Status: SHIPPED | OUTPATIENT
Start: 2020-04-06 | End: 2021-04-20

## 2020-06-16 ENCOUNTER — OFFICE VISIT (OUTPATIENT)
Dept: CARDIOLOGY CLINIC | Age: 70
End: 2020-06-16
Payer: MEDICARE

## 2020-06-16 VITALS
HEIGHT: 63 IN | HEART RATE: 70 BPM | SYSTOLIC BLOOD PRESSURE: 126 MMHG | WEIGHT: 167 LBS | BODY MASS INDEX: 29.59 KG/M2 | DIASTOLIC BLOOD PRESSURE: 66 MMHG | OXYGEN SATURATION: 97 % | TEMPERATURE: 98.5 F

## 2020-06-16 PROCEDURE — 3017F COLORECTAL CA SCREEN DOC REV: CPT | Performed by: INTERNAL MEDICINE

## 2020-06-16 PROCEDURE — 4040F PNEUMOC VAC/ADMIN/RCVD: CPT | Performed by: INTERNAL MEDICINE

## 2020-06-16 PROCEDURE — G8417 CALC BMI ABV UP PARAM F/U: HCPCS | Performed by: INTERNAL MEDICINE

## 2020-06-16 PROCEDURE — 1090F PRES/ABSN URINE INCON ASSESS: CPT | Performed by: INTERNAL MEDICINE

## 2020-06-16 PROCEDURE — 99214 OFFICE O/P EST MOD 30 MIN: CPT | Performed by: INTERNAL MEDICINE

## 2020-06-16 PROCEDURE — 1123F ACP DISCUSS/DSCN MKR DOCD: CPT | Performed by: INTERNAL MEDICINE

## 2020-06-16 PROCEDURE — 1036F TOBACCO NON-USER: CPT | Performed by: INTERNAL MEDICINE

## 2020-06-16 PROCEDURE — G8427 DOCREV CUR MEDS BY ELIG CLIN: HCPCS | Performed by: INTERNAL MEDICINE

## 2020-06-16 PROCEDURE — G8400 PT W/DXA NO RESULTS DOC: HCPCS | Performed by: INTERNAL MEDICINE

## 2020-06-16 NOTE — PATIENT INSTRUCTIONS
take.  How can you care for yourself at home? Watch your portions  · Learn what a serving is. A \"serving\" and a \"portion\" are not always the same thing. Make sure that you are not eating larger portions than are recommended. For example, a serving of pasta is ½ cup. A serving size of meat is 2 to 3 ounces. A 3-ounce serving is about the size of a deck of cards. Measure serving sizes until you are good at Melvin" them. Keep in mind that restaurants often serve portions that are 2 or 3 times the size of one serving. · To keep your energy level up and keep you from feeling hungry, eat often but in smaller portions. · Eat only the number of calories you need to stay at a healthy weight. If you need to lose weight, eat fewer calories than your body burns (through exercise and other physical activity). Eat more fruits and vegetables  · Eat a variety of fruit and vegetables every day. Dark green, deep orange, red, or yellow fruits and vegetables are especially good for you. Examples include spinach, carrots, peaches, and berries. · Keep carrots, celery, and other veggies handy for snacks. Buy fruit that is in season and store it where you can see it so that you will be tempted to eat it. · Cook dishes that have a lot of veggies in them, such as stir-fries and soups. Limit saturated and trans fat  · Read food labels, and try to avoid saturated and trans fats. They increase your risk of heart disease. · Use olive or canola oil when you cook. · Bake, broil, grill, or steam foods instead of frying them. · Choose lean meats instead of high-fat meats such as hot dogs and sausages. Cut off all visible fat when you prepare meat. · Eat fish, skinless poultry, and meat alternatives such as soy products instead of high-fat meats. Soy products, such as tofu, may be especially good for your heart. · Choose low-fat or fat-free milk and dairy products.   Eat foods high in fiber  · Eat a variety of grain products every

## 2020-06-16 NOTE — PROGRESS NOTES
[facility]                     Cardiology Progress Note    Ulises Santos      2020     CC: \"I have been doing great with no angina. \"     HPI:  The patient is 71 y.o. female with a past medical history significant for hypertension, hyperlipidemia, diabetes, CAD s/p stent placement, s/p CABG 2011, depression s/p CABG. Today, she is here for follow up for her CAD. She denies any specific cardiac complaints. She is now working out on an exercise bike, 5 miles daily, working in her yard and garden. She offers that her sugar has been controlled except her fasting blood sugar around 130-140's. She reports medical therapy compliance and feels she is tolerating. Recent blood work in February. She is now working on a [employer]. Denies any abnormal bruising or bleeding on DAPT. Patient denies any symptoms of chest pain, pressure, tightness, shortness of breath at rest, CASSIDY, nausea, vomiting, near syncope, syncope, heart racing, palpitations, dizziness, lightheaded, PND, orthopnea, wheezing, diaphoresis, BLE edema, bilateral lower extremities pain, cramping or fatigue.        Past Medical History:   Diagnosis Date    CAD (coronary artery disease)     [facility]     Diabetes mellitus ([facility]     Hyperlipidemia     Hypertension     Kidney calculi     lithitripsy x2 2009 dec    Pneumonia 2009    Vegetarian diet      Past Surgical History:   Procedure Laterality Date    APPENDECTOMY      BACK SURGERY       SECTION      CHOLECYSTECTOMY      CORONARY ANGIOPLASTY WITH STENT PLACEMENT      LITHOTRIPSY      TONSILLECTOMY      UPPER GASTROINTESTINAL ENDOSCOPY       Family History   Problem Relation Age of Onset    Other Mother     Other Father     Diabetes Maternal Grandmother      Social History     Tobacco Use    Smoking status: Never Smoker    Smokeless tobacco: Never Used   Substance Use Topics    Alcohol use: Medication Sig Dispense Refill    clopidogrel (PLAVIX) 75 MG tablet TAKE 1 TABLET EVERY DAY 90 tablet 3    nitroGLYCERIN (NITROSTAT) 0.4 MG SL tablet PLACE 1 TABLET UNDER THE TONGUE EVERY 5 MINUTES AS NEEDED FOR CHEST PAIN 25 tablet 6    rosuvastatin (CRESTOR) 40 MG tablet Take 1 tablet by mouth daily 30 tablet 5    RANEXA 500 MG extended release tablet TAKE 1 TABLET BY MOUTH TWICE A  tablet 3    lisinopril (PRINIVIL;ZESTRIL) 5 MG tablet TAKE 1 TABLET BY MOUTH EVERY DAY 90 tablet 3    fenofibrate 160 MG tablet TAKE 1 TABLET BY MOUTH EVERY DAY 90 tablet 3    glipiZIDE (GLUCOTROL) 5 MG tablet Take 5 mg by mouth every morning (before breakfast)      dapagliflozin (FARXIGA) 5 MG tablet Take 5 mg by mouth every morning      Icosapent Ethyl (VASCEPA) 1 g CAPS capsule Take 2 capsules by mouth 2 times daily 60 capsule 5    Blood Glucose Monitoring Suppl (TRUE METRIX METER) w/Device KIT 1 kit by Does not apply route daily 1 kit 0    blood glucose test strips (TRUE METRIX BLOOD GLUCOSE TEST) strip 1 each by In Vitro route daily As needed. 100 each 3    niacin 500 MG tablet Take 500 mg by mouth 3 times daily      Handicap Placard MISC by Does not apply route Handicap to  in 5 years for patient's history of CAD. Patient unable to walk more than 200 feet without stopping to rest. 1 each 0    metoprolol (LOPRESSOR) 25 MG tablet Take 1 tablet by mouth 2 times daily. 180 tablet 3    Multiple Vitamins-Minerals (CENTRUM SILVER PO) Take  by mouth daily.  aspirin EC 81 MG EC tablet Take 1 tablet by mouth daily. 30 tablet 3    omeprazole (PRILOSEC) 20 MG capsule Take 20 mg by mouth as needed.  promethazine (PHENERGAN) 25 MG tablet Take 25 mg by mouth every 6 hours as needed for Nausea.  meclizine (ANTIVERT) 25 MG tablet Take 25 mg by mouth 3 times daily as needed.  Omega-3 Fatty Acids (FISH OIL BURP-LESS) 1200 MG CAPS Take 2,400 mg by mouth.       cetirizine (ZYRTEC) 10 MG tablet Take 10 mg by mouth as needed. No current facility-administered medications for this visit. Facility-Administered Medications Ordered in Other Visits   Medication Dose Route Frequency Provider Last Rate Last Dose    aminocaproic acid (AMICAR) 5 g in sodium chloride 0.9 % 250 mL IV bolus  5 g Intravenous Once Idris Vázquez MD        insulin regular (HUMULIN R;NOVOLIN R) 100 Units in sodium chloride 0.9 % 100 mL infusion  0.1 Units/kg/hr Intravenous Once Idris Vázquez MD        phenylephrine (COLIN-SYNEPHRINE) 50 mg in dextrose 5 % 250 mL infusion  100 mcg/min Intravenous Once Idris Vázquez MD        lactated ringers infusion   Intravenous Once Idris Vázquez MD        pantoprazole (PROTONIX) injection 40 mg  40 mg Intravenous Once Ken Lyle MD           Lab Review:     Lab Results   Component Value Date    TRIG 702 10/13/2019    HDL 26 02/17/2020    HDL 27 04/18/2012    LDLCALC see below 02/17/2020    LDLDIRECT 97 02/17/2020    LABVLDL see below 02/17/2020    10/27/16  Lab Results   Component Value Date    HGB 12.7 10/13/2019    HCT 39.2 10/13/2019       Lab Results   Component Value Date     10/14/2019      Lab Results   Component Value Date    K 4.0 10/14/2019    K 4.1 10/13/2019     Lab Results   Component Value Date    BUN 15 10/14/2019    CREATININE 0.7 10/14/2019       Image Review:     CABGx3 4/2011 at [facility] per Dr. Means Rounds    Stress:10/10/2013  Summary   Normal myocardial perfusion study. Normal LV size and systolic function. ECG findings reported separately. Cath:3/27/2014  POSTOPERATIVE DIAGNOSES:  1. Stenosis 90% to 95% proximal, 40% to 50% mid and 80% distal right coronary artery stenosis with a competitive flow in the posterolateral and posterior descending artery branches. 2. Patent left main trunk. 3. Mild disease of the proximal left anterior descending and 100% mid left anterior descending occlusion.   4. Stenosis 50% to 60% of the proximal portion of the obtuse marginal branch and 80% stenosis of the mid portion of the obtuse marginal branch. 5. Saphenous vein graft to distal right coronary artery widely patent with mild aneurysmal dilatation of the saphenous vein graft. 6. Patent saphenous vein graft to the diagonal branch with evidence of mild aneurysmal dilatation. 7. Patent left internal mammary artery to the left anterior descending with diffuse disease of the left anterior descending beyond anastomosis. 8. Normal left ventricular systolic function with a _____ of 55% to 60% with normal left heart hemodynamics. 9. Patent femoral and iliac artery. 10. Successful angioplasty of the circumflex artery which was partially complicated by a proximal dissection requiring 2 Drug-Eluting stents with a final diameter of the proximal area of 2.42 and a distal area of 2.37. Stress test 7/20/18      1. Technically a satisfactory study.    2. Non diagnostic pharmacological stress portion of the study.    3. Moderate sized mild intensity reversible defect involving the inferior    wall that may suggest ischemia. Bowel adjacent to inferior wall may affect    the accuracy of the study.    4. Gated Study shows normal LV size and Systolic function; EF is 70 %. EKG   1/10/18 normal sinus rhythm with minor ST and T wave abnormalities. 6/16/20 none      Assessment/Plan:     Coronary artery disease  She denies any symptoms of angina with good exercise capacity. She is using stationary bike on a daily basis her nuclear stress test from 7/2018 did show mild intensity reversible defect involving the inferior wall. I have informed her if she becomes symptomatic to come to the ER. She fully understands the consequences of recurrent MI if she has progression of coronary artery disease. Pt is on BBlocker, Statin and Antiplatelet therapy. Hypertension, essential  Blood pressure is stable today.  BMP from 10/2019 is WNL.       Hyperlipidemia, essential   Last lipid panel 2/17/20 abnormal  and HDL 26. Continue Crestor 40 mg daily, fenofibrate and Vascepa. Will repeat liver/lipid profile prior to next follow up 7-8/2020. CVA  Hospitalized 10/12/19-10/14/19. Continue Plavix. Denies any recurrence. Follow up in 6 months. Have instructed her to obtain flu vaccine this fall/yearly. Thank you very much for allowing me to participate in the care of your patient. Please do not hesitate to contact me if you have any questions. Medical decision making- high  Sincerely,  Debora Bullock MD  Physician Attestation:  The scribes documentation has been prepared under my direction and personally reviewed by me in its entirety. I confirm that the note above accurately reflects all work, treatment, procedures, and medical decision making performed by me. [address] [place] [address] Zak Harley [address]  Ph: [phone]  Fax: [phone]    This note was scribed in the presence of Dr. Janelle Spain MD by Amador Vigil RN.

## 2020-07-27 RX ORDER — ROSUVASTATIN CALCIUM 40 MG/1
TABLET, COATED ORAL
Qty: 90 TABLET | Refills: 1 | Status: SHIPPED | OUTPATIENT
Start: 2020-07-27 | End: 2021-01-21

## 2020-08-12 ENCOUNTER — TELEPHONE (OUTPATIENT)
Dept: CARDIOLOGY CLINIC | Age: 70
End: 2020-08-12

## 2020-08-12 NOTE — TELEPHONE ENCOUNTER
Placed order in Epic for Hgb A1C and BMP. Called patient and made aware to have all 4 tests done tomorrow. Patient verbalized and confirmed understanding.

## 2020-08-12 NOTE — TELEPHONE ENCOUNTER
Pt called stating she is going to go get her lab work done tomorrow and she wants to make sure Dr Zan Sandoval add's on a Hemoglobin A1C to be drawn because she is diabetic and needs it to be done.

## 2020-08-13 DIAGNOSIS — E78.5 HYPERLIPIDEMIA, UNSPECIFIED HYPERLIPIDEMIA TYPE: ICD-10-CM

## 2020-08-13 DIAGNOSIS — I25.10 CORONARY ARTERY DISEASE INVOLVING NATIVE CORONARY ARTERY OF NATIVE HEART WITHOUT ANGINA PECTORIS: ICD-10-CM

## 2020-08-13 DIAGNOSIS — E13.9 DIABETES 1.5, MANAGED AS TYPE 2 (HCC): ICD-10-CM

## 2020-08-13 LAB
ALBUMIN SERPL-MCNC: 4.5 G/DL (ref 3.4–5)
ALP BLD-CCNC: 62 U/L (ref 40–129)
ALT SERPL-CCNC: 18 U/L (ref 10–40)
ANION GAP SERPL CALCULATED.3IONS-SCNC: 14 MMOL/L (ref 3–16)
AST SERPL-CCNC: 19 U/L (ref 15–37)
BILIRUB SERPL-MCNC: 0.3 MG/DL (ref 0–1)
BILIRUBIN DIRECT: <0.2 MG/DL (ref 0–0.3)
BILIRUBIN, INDIRECT: NORMAL MG/DL (ref 0–1)
BUN BLDV-MCNC: 18 MG/DL (ref 7–20)
CALCIUM SERPL-MCNC: 9.7 MG/DL (ref 8.3–10.6)
CHLORIDE BLD-SCNC: 102 MMOL/L (ref 99–110)
CHOLESTEROL, TOTAL: 173 MG/DL (ref 0–199)
CO2: 24 MMOL/L (ref 21–32)
CREAT SERPL-MCNC: 1 MG/DL (ref 0.6–1.2)
ESTIMATED AVERAGE GLUCOSE: 180 MG/DL
GFR AFRICAN AMERICAN: >60
GFR NON-AFRICAN AMERICAN: 55
GLUCOSE BLD-MCNC: 155 MG/DL (ref 70–99)
HBA1C MFR BLD: 7.9 %
HDLC SERPL-MCNC: 26 MG/DL (ref 40–60)
LDL CHOLESTEROL CALCULATED: ABNORMAL MG/DL
LDL CHOLESTEROL DIRECT: 67 MG/DL
POTASSIUM SERPL-SCNC: 4.2 MMOL/L (ref 3.5–5.1)
SODIUM BLD-SCNC: 140 MMOL/L (ref 136–145)
TOTAL PROTEIN: 7.1 G/DL (ref 6.4–8.2)
TRIGL SERPL-MCNC: 545 MG/DL (ref 0–150)
VLDLC SERPL CALC-MCNC: ABNORMAL MG/DL

## 2020-10-19 RX ORDER — ICOSAPENT ETHYL 1000 MG/1
CAPSULE ORAL
Qty: 120 CAPSULE | Refills: 6 | Status: SHIPPED | OUTPATIENT
Start: 2020-10-19 | End: 2022-01-03 | Stop reason: SDUPTHER

## 2020-12-17 RX ORDER — FENOFIBRATE 160 MG/1
TABLET ORAL
Qty: 90 TABLET | Refills: 3 | Status: SHIPPED | OUTPATIENT
Start: 2020-12-17 | End: 2021-12-10

## 2021-01-15 RX ORDER — RANOLAZINE 500 MG/1
TABLET, EXTENDED RELEASE ORAL
Qty: 180 TABLET | Refills: 3 | Status: SHIPPED | OUTPATIENT
Start: 2021-01-15 | End: 2022-01-03 | Stop reason: SDUPTHER

## 2021-01-21 RX ORDER — ROSUVASTATIN CALCIUM 40 MG/1
TABLET, COATED ORAL
Qty: 90 TABLET | Refills: 0 | Status: SHIPPED | OUTPATIENT
Start: 2021-01-21 | End: 2021-04-19

## 2021-02-12 RX ORDER — LISINOPRIL 5 MG/1
TABLET ORAL
Qty: 90 TABLET | Refills: 3 | Status: SHIPPED | OUTPATIENT
Start: 2021-02-12 | End: 2022-01-03 | Stop reason: SDUPTHER

## 2021-04-19 NOTE — TELEPHONE ENCOUNTER
Last ov 6/16/20  Pending appt overdue for apt  Last refill 1/21/21 #90 NR  Last labs 8/13/20      Pharm notified and my chart message sent to pt

## 2021-04-20 RX ORDER — ROSUVASTATIN CALCIUM 40 MG/1
TABLET, COATED ORAL
Qty: 30 TABLET | Refills: 0 | Status: SHIPPED | OUTPATIENT
Start: 2021-04-20 | End: 2021-05-13

## 2021-04-20 RX ORDER — CLOPIDOGREL BISULFATE 75 MG/1
TABLET ORAL
Qty: 90 TABLET | Refills: 3 | Status: SHIPPED | OUTPATIENT
Start: 2021-04-20 | End: 2022-01-03 | Stop reason: SDUPTHER

## 2021-05-13 RX ORDER — ROSUVASTATIN CALCIUM 40 MG/1
TABLET, COATED ORAL
Qty: 30 TABLET | Refills: 0 | Status: SHIPPED | OUTPATIENT
Start: 2021-05-13 | End: 2021-06-10

## 2021-06-10 RX ORDER — ROSUVASTATIN CALCIUM 40 MG/1
TABLET, COATED ORAL
Qty: 30 TABLET | Refills: 0 | Status: SHIPPED | OUTPATIENT
Start: 2021-06-10 | End: 2021-07-06

## 2021-06-10 NOTE — TELEPHONE ENCOUNTER
Last ov 6/16/20  Pending appt overdue for apt  Last refill 5/13/21 #30 NR  Last labs 12/17/20 lipid in care everywhere      My chart message sent to pt of needing an apt lumbar lami / fusion

## 2021-07-06 RX ORDER — ROSUVASTATIN CALCIUM 40 MG/1
TABLET, COATED ORAL
Qty: 30 TABLET | Refills: 1 | Status: SHIPPED | OUTPATIENT
Start: 2021-07-06 | End: 2021-08-12

## 2021-07-06 NOTE — TELEPHONE ENCOUNTER
Last OV: 06/16/2020  Next OV: -  Most recent Labs  LDL Cholesterol Direct,Lipid Panal,Hepatic Panel 08/13/2020  Last PT/INR (if needed):-  Last EKG (if needed):10/12/2019    *

## 2021-08-12 RX ORDER — ROSUVASTATIN CALCIUM 40 MG/1
TABLET, COATED ORAL
Qty: 30 TABLET | Refills: 0 | Status: SHIPPED | OUTPATIENT
Start: 2021-08-12 | End: 2021-09-09

## 2021-08-12 NOTE — TELEPHONE ENCOUNTER
Last OV: 6/16/20  Next OV: needs apt , left vm  Last refill:7/6/21  Most recent Labs: 8/13/20  Last PT/INR (if needed):  Last EKG (if needed):10/12/19

## 2021-09-09 RX ORDER — ROSUVASTATIN CALCIUM 40 MG/1
TABLET, COATED ORAL
Qty: 30 TABLET | Refills: 0 | Status: SHIPPED | OUTPATIENT
Start: 2021-09-09 | End: 2021-10-25 | Stop reason: SDUPTHER

## 2021-09-09 NOTE — TELEPHONE ENCOUNTER
Last ov 6/16/20  Pending appt overdue for apt  Last refill 8/12/21 #30 NR  Last labs 12/17/20 lipid in care everywhere      My chart message sent to pt of needing an apt

## 2021-10-05 ENCOUNTER — OFFICE VISIT (OUTPATIENT)
Dept: UROGYNECOLOGY | Age: 71
End: 2021-10-05
Payer: MEDICARE

## 2021-10-05 VITALS
SYSTOLIC BLOOD PRESSURE: 158 MMHG | DIASTOLIC BLOOD PRESSURE: 80 MMHG | OXYGEN SATURATION: 98 % | HEART RATE: 74 BPM | TEMPERATURE: 97.2 F | RESPIRATION RATE: 18 BRPM

## 2021-10-05 DIAGNOSIS — N89.8 VAGINAL ITCHING: Primary | ICD-10-CM

## 2021-10-05 DIAGNOSIS — R30.0 DYSURIA: ICD-10-CM

## 2021-10-05 DIAGNOSIS — B37.2 YEAST DERMATITIS: ICD-10-CM

## 2021-10-05 DIAGNOSIS — E11.9 TYPE 2 DIABETES MELLITUS WITHOUT COMPLICATION, WITHOUT LONG-TERM CURRENT USE OF INSULIN (HCC): ICD-10-CM

## 2021-10-05 LAB
BILIRUBIN, POC: NORMAL
BLOOD URINE, POC: NORMAL
CLARITY, POC: CLEAR
COLOR, POC: YELLOW
GLUCOSE URINE, POC: NORMAL
KETONES, POC: NORMAL
LEUKOCYTE EST, POC: NORMAL
NITRITE, POC: NORMAL
PH, POC: 6.5
PROTEIN, POC: NORMAL
SPECIFIC GRAVITY, POC: 1
UROBILINOGEN, POC: NORMAL

## 2021-10-05 PROCEDURE — 99204 OFFICE O/P NEW MOD 45 MIN: CPT | Performed by: NURSE PRACTITIONER

## 2021-10-05 PROCEDURE — 51701 INSERT BLADDER CATHETER: CPT | Performed by: NURSE PRACTITIONER

## 2021-10-05 PROCEDURE — 81002 URINALYSIS NONAUTO W/O SCOPE: CPT | Performed by: NURSE PRACTITIONER

## 2021-10-05 RX ORDER — NYSTATIN AND TRIAMCINOLONE ACETONIDE 100000; 1 [USP'U]/G; MG/G
OINTMENT TOPICAL
Qty: 60 G | Refills: 1 | Status: SHIPPED | OUTPATIENT
Start: 2021-10-05 | End: 2021-12-28

## 2021-10-05 RX ORDER — SACCHAROMYCES BOULARDII 250 MG
250 CAPSULE ORAL 2 TIMES DAILY
COMMUNITY

## 2021-10-05 RX ORDER — FLUCONAZOLE 150 MG/1
150 TABLET ORAL
Qty: 3 TABLET | Refills: 0 | Status: SHIPPED | OUTPATIENT
Start: 2021-10-05 | End: 2021-10-12

## 2021-10-05 ASSESSMENT — ENCOUNTER SYMPTOMS: SHORTNESS OF BREATH: 1

## 2021-10-05 NOTE — PROGRESS NOTES
10/5/2021      HPI:     Name: Selma Ritchie  YOB: 1950    CC: Patient is a 79 y.o. female who is seen in consultation from Jason Sesay MD   for evaluation of Yeast Infection. HPI:    PMH:  Diabetes, CAD, HTN, HLD,   PSH:  Nafisa, Appy, Lithotripsy, C/S, Tonsils, back  Last HgbA1C : 7.9  Reports FBS to be 110-130    She denies urinary incontinence  She has all female parts  Denies prolapse    She presents today for persistent vaginal itching starting 3 weeks ago. She has used, monistat cream, vagisil cream, sensitive wipes, diflucan x 2. Burning with urination    She reports vaginal itching that was persistent at the beginning and is now \"on and off\"   Reports urinary burning in the beginning relieved with AZO    She denies changes in laundry detergent or soaps. Bladder control problem: No   Bladder emptying problems:  No   Prolapse/Vaginal Support problems: No   Bowel problem(s): No  Sexual History:  reports previously being sexually active and has had partner(s) who are Male. Pelvic Pain:  No  Ob/Gyn History:    OB History   No obstetric history on file. Past Medical History:   Past Medical History:   Diagnosis Date    CAD (coronary artery disease)     [facility] [address] [facility]     Diabetes mellitus ([facility] [facility]     Hyperlipidemia     Hypertension     Kidney calculi     lithitripsy x2 2009 dec    Pneumonia     Vegetarian diet      Past Surgical History:   Past Surgical History:   Procedure Laterality Date    APPENDECTOMY      BACK SURGERY       SECTION      CHOLECYSTECTOMY      CORONARY ANGIOPLASTY WITH STENT PLACEMENT      LITHOTRIPSY      TONSILLECTOMY      UPPER GASTROINTESTINAL ENDOSCOPY       Allergies:    Allergies   Allergen Reactions    Effient [Prasugrel] Swelling     Swelling in tongue, itching    Ranolazine Hives, Itching and Swelling     Can only take name brand Ranexa  Can not take generic    Azithromycin      Thrush on tongue    Dilaudid [Hydromorphone Hcl]      Severe head pain and ear pressure, very weak    Dolobid [Diflunisal] Nausea Only    Doxycycline      n  & v      Ranolazine Er Hives, Itching and Swelling     Can only take name brand Ranexa  Can not take generic    Vibramycin [Doxycycline Calcium]      Severe nausea and vomiting    Morphine Sulfate Nausea And Vomiting     Current Medications:  Current Outpatient Medications   Medication Sig Dispense Refill    metFORMIN (GLUCOPHAGE) 500 MG tablet TAKE 1 TABLET BY MOUTH 2 (TWO) TIMES DAILY WITH MEALS. FOR DIABETES      saccharomyces boulardii (FLORASTOR) 250 MG capsule Take 250 mg by mouth 2 times daily      nystatin-triamcinolone (MYCOLOG) 925673-8.1 UNIT/GM-% ointment Apply topically 2 times daily. 60 g 1    fluconazole (DIFLUCAN) 150 MG tablet Take 1 tablet by mouth every 72 hours for 3 doses 3 tablet 0    rosuvastatin (CRESTOR) 40 MG tablet TAKE 1 TABLET BY MOUTH EVERY DAY 30 tablet 0    clopidogrel (PLAVIX) 75 MG tablet TAKE 1 TABLET BY MOUTH EVERY DAY 90 tablet 3    lisinopril (PRINIVIL;ZESTRIL) 5 MG tablet TAKE 1 TABLET BY MOUTH EVERY DAY 90 tablet 3    ranolazine (RANEXA) 500 MG extended release tablet TAKE 1 TABLET BY MOUTH TWICE A  tablet 3    fenofibrate (TRIGLIDE) 160 MG tablet TAKE 1 TABLET BY MOUTH EVERY DAY 90 tablet 3    VASCEPA 1 g CAPS capsule TAKE 2 CAPSULES BY MOUTH TWICE A  capsule 6    nitroGLYCERIN (NITROSTAT) 0.4 MG SL tablet PLACE 1 TABLET UNDER THE TONGUE EVERY 5 MINUTES AS NEEDED FOR CHEST PAIN 25 tablet 6    glipiZIDE (GLUCOTROL) 5 MG tablet Take 5 mg by mouth every morning (before breakfast)      dapagliflozin (FARXIGA) 5 MG tablet Take 5 mg by mouth every morning      Blood Glucose Monitoring Suppl (TRUE METRIX METER) w/Device KIT 1 kit by Does not apply route daily 1 kit 0    blood glucose test strips (TRUE METRIX BLOOD GLUCOSE TEST) strip 1 each by In Vitro route daily As needed. 100 each 3    niacin 500 MG tablet Take 500 mg by mouth 3 times daily      Handicap Placard MISC by Does not apply route Handicap to  in 5 years for patient's history of CAD. Patient unable to walk more than 200 feet without stopping to rest. 1 each 0    metoprolol (LOPRESSOR) 25 MG tablet Take 1 tablet by mouth 2 times daily. 180 tablet 3    Multiple Vitamins-Minerals (CENTRUM SILVER PO) Take  by mouth daily.  aspirin EC 81 MG EC tablet Take 1 tablet by mouth daily. 30 tablet 3    omeprazole (PRILOSEC) 20 MG capsule Take 20 mg by mouth as needed.  promethazine (PHENERGAN) 25 MG tablet Take 25 mg by mouth every 6 hours as needed for Nausea.  meclizine (ANTIVERT) 25 MG tablet Take 25 mg by mouth 3 times daily as needed.  Omega-3 Fatty Acids (FISH OIL BURP-LESS) 1200 MG CAPS Take 2,400 mg by mouth.  cetirizine (ZYRTEC) 10 MG tablet Take 10 mg by mouth as needed. No current facility-administered medications for this visit.      Facility-Administered Medications Ordered in Other Visits   Medication Dose Route Frequency Provider Last Rate Last Admin    aminocaproic acid (AMICAR) 5 g in sodium chloride 0.9 % 250 mL IV bolus  5 g IntraVENous Once Torsten Mccullough MD        insulin regular (HUMULIN R;NOVOLIN R) 100 Units in sodium chloride 0.9 % 100 mL infusion  0.1 Units/kg/hr IntraVENous Once Torsten Mccullough MD        phenylephrine (COLIN-SYNEPHRINE) 50 mg in dextrose 5 % 250 mL infusion  100 mcg/min IntraVENous Once Torsten Mccullough MD        lactated ringers infusion   IntraVENous Once Torsten Mccullough MD        pantoprazole (PROTONIX) injection 40 mg  40 mg IntraVENous Once Adebayo Sullivan MD         Social History:   Social History     Socioeconomic History    Marital status:      Spouse name: Not on file    Number of children: Not on file    Years of education: Not on file    Highest education level: Not on file   Occupational History    Not on file   Tobacco Use    Smoking status: Never Smoker    Smokeless tobacco: Never Used   Vaping Use    Vaping Use: Never used   Substance and Sexual Activity    Alcohol use: No    Drug use: Never    Sexual activity: Not Currently     Partners: Male   Other Topics Concern    Not on file   Social History Narrative    Not on file     Social Determinants of Health     Financial Resource Strain:     Difficulty of Paying Living Expenses:    Food Insecurity:     Worried About Running Out of Food in the Last Year:     [address] in the Last Year:    Transportation Needs:     Lack of Transportation (Medical):  Lack of Transportation (Non-Medical):    Physical Activity:     Days of Exercise per Week:     Minutes of Exercise per Session:    Stress:     Feeling of Stress :    Social Connections:     Frequency of Communication with Friends and Family:     Frequency of Social Gatherings with Friends and Family:     Attends Buddhist Services:     Active Member of Clubs or Organizations:     Attends Club or Organization Meetings:     Marital Status:    Intimate Partner Violence:     Fear of Current or Ex-Partner:     Emotionally Abused:     Physically Abused:     Sexually Abused:      Family History:   Family History   Problem Relation Age of Onset    Other Mother     Other Father     Diabetes Maternal Grandmother      Review of System  Review of Systems   Respiratory: Positive for shortness of breath. Cardiovascular: Positive for chest pain and palpitations. Genitourinary: Positive for genital sores and vaginal pain. Allergic/Immunologic: Positive for environmental allergies. Neurological: Positive for dizziness and syncope. All other systems reviewed and are negative. A review of systems was done by the patient and reviewed by me and scanned into media today.     Objective:     Vital Signs  Vitals:    10/05/21 0903   BP: (!) 158/80   Pulse: 74   Resp: 18   Temp: 97.2 °F (36.2 °C)   SpO2: 98% Physical Exam  Physical Exam  Vitals and nursing note reviewed. Constitutional:       Appearance: Normal appearance. HENT:      Head: Normocephalic. Eyes:      Conjunctiva/sclera: Conjunctivae normal.   Cardiovascular:      Rate and Rhythm: Normal rate. Pulmonary:      Effort: Pulmonary effort is normal.   Genitourinary:     Vagina: Vaginal discharge present. Comments: Thin yellow discharge present in vault  Musculoskeletal:         General: Normal range of motion. Cervical back: Normal range of motion. Skin:     Findings: Erythema and rash present. Comments: Rash consistent with yeast throughout femoral folds, vulva, buttocks. Small laceration/broken skin at perineum. Neurological:      General: No focal deficit present. Mental Status: She is alert and oriented to person, place, and time. Psychiatric:         Mood and Affect: Mood normal.         Behavior: Behavior normal.         Thought Content: Thought content normal.       Results for POC orders placed in visit on 10/05/21   POCT Urinalysis no Micro   Result Value Ref Range    Color, UA Yellow     Clarity, UA Clear     Glucose, UA POC Neg     Bilirubin, UA Neg     Ketones, UA Pos     Spec Grav, UA 1.005     Blood, UA POC Small     pH, UA 6.5     Protein, UA POC Neg     Urobilinogen, UA Neg     Leukocytes, UA Neg     Nitrite, UA Neg              Assessment/Plan:     Zeenat Pickett is a 79 y.o. female with   1. Vaginal itching    2. Yeast dermatitis    3. Type 2 diabetes mellitus without complication, without long-term current use of insulin ([facility] [address]    -Records reviewed    Yeast Dermatitis: patient to begin treatment with Diflucan 150mg q72 hours for 3 doses and mycolog ointment topically to affected area BID for 5 days. Dry skin well after shower, using cool setting on hair dryer. Advised no further use of vagisil. Will send urine for culture. Call if any fevers, any drainage or worsening infection.   Follow up 1 week to assure improvement. ANITA Porter CNP      Orders Placed This Encounter   Procedures    Culture, Urine     Order Specific Question:   Specify (ex-cath, midstream, cysto, etc)? Answer:   Straight Cath    VAGINAL PATHOGENS PROBE *A    POCT Urinalysis no Micro    KS INSERT,NON-INDWELLING BLADDER CATHETER     Orders Placed This Encounter   Medications    nystatin-triamcinolone (MYCOLOG) 879411-4.1 UNIT/GM-% ointment     Sig: Apply topically 2 times daily.      Dispense:  60 g     Refill:  1    fluconazole (DIFLUCAN) 150 MG tablet     Sig: Take 1 tablet by mouth every 72 hours for 3 doses     Dispense:  3 tablet     Refill:  0       ANITA Porter CNP

## 2021-10-05 NOTE — LETTER
[address]  Urogynecology  [address]  Phone: [phone]  Fax: [phone]    ANITA Bailey - CNP    October 5, 2021     MD [id number]  [address] [facility] [address]    Patient: Zeenat Pickett   MR Number: [id number]   YOB: 1950   Date of Visit: 10/5/2021       Dear Jc Lucas:    Thank you for referring Zeenat Pickett to me for evaluation/treatment. Below are the relevant portions of my assessment and plan of care. I will see her next week to assure she is improving. If you have questions, please do not hesitate to call me. I look forward to following [facility] along with you.     Sincerely,      Steve Simon, ANITA - [address] [facility] Urogynecology  [address]  [place]

## 2021-10-06 LAB
CANDIDA SPECIES, DNA PROBE: NORMAL
GARDNERELLA VAGINALIS, DNA PROBE: NORMAL
TRICHOMONAS VAGINALIS DNA: NORMAL

## 2021-10-07 LAB — URINE CULTURE, ROUTINE: NORMAL

## 2021-10-12 ENCOUNTER — OFFICE VISIT (OUTPATIENT)
Dept: UROGYNECOLOGY | Age: 71
End: 2021-10-12
Payer: MEDICARE

## 2021-10-12 VITALS
TEMPERATURE: 98.7 F | OXYGEN SATURATION: 98 % | SYSTOLIC BLOOD PRESSURE: 128 MMHG | DIASTOLIC BLOOD PRESSURE: 76 MMHG | RESPIRATION RATE: 14 BRPM | HEART RATE: 84 BPM

## 2021-10-12 DIAGNOSIS — N89.8 VAGINAL ITCHING: Primary | ICD-10-CM

## 2021-10-12 DIAGNOSIS — E11.9 TYPE 2 DIABETES MELLITUS WITHOUT COMPLICATION, WITHOUT LONG-TERM CURRENT USE OF INSULIN (HCC): ICD-10-CM

## 2021-10-12 DIAGNOSIS — B37.2 YEAST DERMATITIS: ICD-10-CM

## 2021-10-12 PROCEDURE — 56605 BIOPSY OF VULVA/PERINEUM: CPT | Performed by: NURSE PRACTITIONER

## 2021-10-12 PROCEDURE — 99213 OFFICE O/P EST LOW 20 MIN: CPT | Performed by: NURSE PRACTITIONER

## 2021-10-12 ASSESSMENT — ENCOUNTER SYMPTOMS: BACK PAIN: 1

## 2021-10-12 NOTE — PROGRESS NOTES
10/12/2021       HPI:     Name: Valentino Barrios  YOB: 1950    CC: Patient is a 79 y.o. presenting for evaluation of 1 week follow up of probable yeast infection of skin. HPI: How long have you had this problem? approx 4 weeks  Please rate the severity of your problem: mild  Anything make it better? She was given Diflucan 150mg x3 doses. And asked to apply mycolog. Doing much better today, she feels its almost 100% improved. Ob/Gyn History:    OB History   No obstetric history on file. Past Medical History:   Past Medical History:   Diagnosis Date    CAD (coronary artery disease)     [facility]ere[facility] occlusion [facility] infarction ([facility] [facility]     Diabetes mellitus ([facility] [facility]     Hyperlipidemia     Hypertension     Kidney calculi     lithitripsy x2 2009 dec    Pneumonia 2009    Vegetarian diet      Past Surgical History:   Past Surgical History:   Procedure Laterality Date    APPENDECTOMY      BACK SURGERY       SECTION      CHOLECYSTECTOMY      CORONARY ANGIOPLASTY WITH STENT PLACEMENT      LITHOTRIPSY      TONSILLECTOMY      UPPER GASTROINTESTINAL ENDOSCOPY       Allergies: Allergies   Allergen Reactions    Effient [Prasugrel] Swelling     Swelling in tongue, itching    Ranolazine Hives, Itching and Swelling     Can only take name brand Ranexa  Can not take generic    Azithromycin      Thrush on tongue    Dilaudid [Hydromorphone Hcl]      Severe head pain and ear pressure, very weak    Dolobid [Diflunisal] Nausea Only    Doxycycline      n  & v      Ranolazine Er Hives, Itching and Swelling     Can only take name brand Ranexa  Can not take generic    Vibramycin [Doxycycline Calcium]      Severe nausea and vomiting    Morphine Sulfate Nausea And Vomiting     Current Medications:  Current Outpatient Medications   Medication Sig Dispense Refill    metFORMIN (GLUCOPHAGE) 500 MG tablet TAKE 1 TABLET BY MOUTH 2 (TWO) TIMES DAILY WITH MEALS.  FOR DIABETES  saccharomyces boulardii (FLORASTOR) 250 MG capsule Take 250 mg by mouth 2 times daily      nystatin-triamcinolone (MYCOLOG) 598004-0.1 UNIT/GM-% ointment Apply topically 2 times daily. 60 g 1    rosuvastatin (CRESTOR) 40 MG tablet TAKE 1 TABLET BY MOUTH EVERY DAY 30 tablet 0    clopidogrel (PLAVIX) 75 MG tablet TAKE 1 TABLET BY MOUTH EVERY DAY 90 tablet 3    lisinopril (PRINIVIL;ZESTRIL) 5 MG tablet TAKE 1 TABLET BY MOUTH EVERY DAY 90 tablet 3    ranolazine (RANEXA) 500 MG extended release tablet TAKE 1 TABLET BY MOUTH TWICE A  tablet 3    fenofibrate (TRIGLIDE) 160 MG tablet TAKE 1 TABLET BY MOUTH EVERY DAY 90 tablet 3    VASCEPA 1 g CAPS capsule TAKE 2 CAPSULES BY MOUTH TWICE A  capsule 6    nitroGLYCERIN (NITROSTAT) 0.4 MG SL tablet PLACE 1 TABLET UNDER THE TONGUE EVERY 5 MINUTES AS NEEDED FOR CHEST PAIN 25 tablet 6    glipiZIDE (GLUCOTROL) 5 MG tablet Take 5 mg by mouth every morning (before breakfast)      dapagliflozin (FARXIGA) 5 MG tablet Take 5 mg by mouth every morning      Blood Glucose Monitoring Suppl (TRUE METRIX METER) w/Device KIT 1 kit by Does not apply route daily 1 kit 0    blood glucose test strips (TRUE METRIX BLOOD GLUCOSE TEST) strip 1 each by In Vitro route daily As needed. 100 each 3    niacin 500 MG tablet Take 500 mg by mouth 3 times daily      Handicap Placard MISC by Does not apply route Handicap to  in 5 years for patient's history of CAD. Patient unable to walk more than 200 feet without stopping to rest. 1 each 0    metoprolol (LOPRESSOR) 25 MG tablet Take 1 tablet by mouth 2 times daily. 180 tablet 3    Multiple Vitamins-Minerals (CENTRUM SILVER PO) Take  by mouth daily.  aspirin EC 81 MG EC tablet Take 1 tablet by mouth daily. 30 tablet 3    omeprazole (PRILOSEC) 20 MG capsule Take 20 mg by mouth as needed.  promethazine (PHENERGAN) 25 MG tablet Take 25 mg by mouth every 6 hours as needed for Nausea.       meclizine (ANTIVERT) 25 MG tablet Take 25 mg by mouth 3 times daily as needed.  Omega-3 Fatty Acids (FISH OIL BURP-LESS) 1200 MG CAPS Take 2,400 mg by mouth.  cetirizine (ZYRTEC) 10 MG tablet Take 10 mg by mouth as needed.  fluconazole (DIFLUCAN) 150 MG tablet Take 1 tablet by mouth every 72 hours for 3 doses (Patient not taking: Reported on 10/12/2021) 3 tablet 0     No current facility-administered medications for this visit.      Facility-Administered Medications Ordered in Other Visits   Medication Dose Route Frequency Provider Last Rate Last Admin    aminocaproic acid (AMICAR) 5 g in sodium chloride 0.9 % 250 mL IV bolus  5 g IntraVENous Once Pura Lindo MD        insulin regular (HUMULIN R;NOVOLIN R) 100 Units in sodium chloride 0.9 % 100 mL infusion  0.1 Units/kg/hr IntraVENous Once Pura Lindo MD        phenylephrine (COLIN-SYNEPHRINE) 50 mg in dextrose 5 % 250 mL infusion  100 mcg/min IntraVENous Once Pura Lindo MD        lactated ringers infusion   IntraVENous Once Pura Lindo MD        pantoprazole (PROTONIX) injection 40 mg  40 mg IntraVENous Once Jaye Kee MD         Social History:   Social History     Socioeconomic History    Marital status:      Spouse name: Not on file    Number of children: Not on file    Years of education: Not on file    Highest education level: Not on file   Occupational History    Not on file   Tobacco Use    Smoking status: Never Smoker    Smokeless tobacco: Never Used   Vaping Use    Vaping Use: Never used   Substance and Sexual Activity    Alcohol use: No    Drug use: Never    Sexual activity: Not Currently     Partners: Male   Other Topics Concern    Not on file   Social History Narrative    Not on file     Social Determinants of Health     Financial Resource Strain:     Difficulty of Paying Living Expenses:    Food Insecurity:     Worried About Running Out of Food in the Last Year:     [address] in the Last Year: Transportation Needs:     Lack of Transportation (Medical):  Lack of Transportation (Non-Medical):    Physical Activity:     Days of Exercise per Week:     Minutes of Exercise per Session:    Stress:     Feeling of Stress :    Social Connections:     Frequency of Communication with Friends and Family:     Frequency of Social Gatherings with Friends and Family:     Attends [employer] Services:     Active Member of Clubs or Organizations:     Attends Club or Organization Meetings:     Marital Status:    Intimate Partner Violence:     Fear of Current or Ex-Partner:     Emotionally Abused:     Physically Abused:     Sexually Abused:      Family History:   Family History   Problem Relation Age of Onset    Other Mother     Other Father     Diabetes Maternal Grandmother      Review of System   Review of Systems   Cardiovascular: Positive for chest pain. Musculoskeletal: Positive for back pain. Allergic/Immunologic: Positive for environmental allergies. Neurological: Positive for dizziness and light-headedness. All other systems reviewed and are negative. A review of systems was done by the patient and reviewed by me and scanned into media today. Objective:     Vitals  Vitals:    10/12/21 0928   BP: 128/76   Pulse: 84   Resp: 14   Temp: 98.7 °F (37.1 °C)   SpO2: 98%     Physical Exam  Physical Exam  Vitals and nursing note reviewed. Constitutional:       Appearance: Normal appearance. HENT:      Head: Normocephalic. Eyes:      Conjunctiva/sclera: Conjunctivae normal.   Cardiovascular:      Rate and Rhythm: Normal rate. Pulmonary:      Effort: Pulmonary effort is normal.   Genitourinary:     Comments: Previous ulceration at perineum remains present and tissue with white appearance at perineum and perirectal   Musculoskeletal:         General: Normal range of motion. Cervical back: Normal range of motion. Skin:     General: Skin is warm and dry.       Comments: Prior yeast rash is completely resolved   Neurological:      General: No focal deficit present. Mental Status: She is alert and oriented to person, place, and time. Psychiatric:         Mood and Affect: Mood normal.         Behavior: Behavior normal.         Thought Content: Thought content normal.       After informed consent was obtained, using Betadine for cleansing and 1% Lidocaine without epinephrine for anesthetic, with sterile technique a 4 mm punch biopsy was used to obtain a biopsy specimen of the lesion. Hemostasis was obtained by pressure and silver nitrate. wound care instructions provided. Be alert for any signs of cutaneous infection. The specimen is labeled and sent to pathology for evaluation. The procedure was well tolerated without complications. No results found for this visit on 10/12/21. Assessment/Plan:     Thu Gutiérrez is a 79 y.o. female with   1. Vaginal itching    2. Yeast dermatitis    3. Type 2 diabetes mellitus without complication, without long-term current use of insulin ([facility])      Yeast dermatitis:  Patient has completed her Diflucan prescription and use of mycolog. Yeast under abdominal fold and femoral folds has completely resolved. Vulvar lesion/itching: Possible Lichen Sclerosus: Vulvar biopsy obtained of irregular tissue. Await results for treatment and follow up plan. ANITA Gracia - CNP      Orders Placed This Encounter   Procedures    MI BIOPSY VULVA/PERINEUM,ONE LESN     No orders of the defined types were placed in this encounter.       ANITA Gracia - CNP

## 2021-10-14 DIAGNOSIS — L90.0 LICHEN SCLEROSUS: Primary | ICD-10-CM

## 2021-10-14 RX ORDER — CLOBETASOL PROPIONATE 0.5 MG/G
OINTMENT TOPICAL
Qty: 45 G | Refills: 1 | Status: SHIPPED | OUTPATIENT
Start: 2021-10-14 | End: 2022-03-10

## 2021-10-25 RX ORDER — ROSUVASTATIN CALCIUM 40 MG/1
TABLET, COATED ORAL
Qty: 30 TABLET | Refills: 0 | Status: SHIPPED | OUTPATIENT
Start: 2021-10-25 | End: 2021-11-18

## 2021-10-25 RX ORDER — ROSUVASTATIN CALCIUM 40 MG/1
TABLET, COATED ORAL
Qty: 30 TABLET | Refills: 2 | Status: SHIPPED | OUTPATIENT
Start: 2021-10-25 | End: 2021-12-28

## 2021-10-25 NOTE — TELEPHONE ENCOUNTER
Last OV: 6/16/2020  Next OV: 11/16/2021  Last refill: 9/9/2021  Most recent Labs: 8/13/2021  Last EKG (if needed):

## 2021-10-25 NOTE — TELEPHONE ENCOUNTER
Medication Refill    Medication needing refilled:CRESTOR    Dosage of the medication:40mg    How are you taking this medication (QD, BID, TID, QID, PRN):TAKE 1 TABLET BY MOUTH EVERY DAY    30 or 90 day supply called in:90    When will you run out of your medication:7 pills left     Which Pharmacy are we sending the medication to?:[facility]/pharmacy #[id number], [address] [place] [address].  Jewell Bentley [phone] Maria Elena Jaquez [phone]     [place] # [phone]

## 2021-11-03 ENCOUNTER — TELEPHONE (OUTPATIENT)
Dept: UROGYNECOLOGY | Age: 71
End: 2021-11-03

## 2021-11-03 NOTE — TELEPHONE ENCOUNTER
[facility] faxed over form stating that additional ICD10 needs to be added to lab order so her labs can be rebilled and approved by [employer]. Signature needed. Form scanned and placed on physician's desk.

## 2021-11-15 NOTE — PROGRESS NOTES
[address]                     Cardiology Progress Note    Mar Sen      2021     CC: \"I have a little SOB, fatigue. \"     HPI:  The patient is 70 y.o. female with a past medical history significant for hypertension, hyperlipidemia, diabetes, CAD s/p stent placement, s/p CABG 2011, depression s/p CABG. Today, she is here for follow up for her CAD. She reports some fatigue and SOB but denies BLE edema. Patient denies exertional chest pain/pressure, dyspnea at rest, CASSIDY, PND, orthopnea, palpitations, lightheadedness, weight changes, changes in LE edema, and syncope. She did not get COVID vaccine. She does reports medical therapy compliance and tolerating.        Past Medical History:   Diagnosis Date    CAD (coronary artery disease)     [facility] [address]     Diabetes mellitus ([facility] [address]     Hyperlipidemia     Hypertension     Kidney calculi     lithitripsy x2 2009 dec    Pneumonia     Vegetarian diet      Past Surgical History:   Procedure Laterality Date    APPENDECTOMY      BACK SURGERY       SECTION      CHOLECYSTECTOMY      CORONARY ANGIOPLASTY WITH STENT PLACEMENT      LITHOTRIPSY      TONSILLECTOMY      UPPER GASTROINTESTINAL ENDOSCOPY       Family History   Problem Relation Age of Onset    Other Mother     Other Father     Diabetes Maternal Grandmother      Social History     Tobacco Use    Smoking status: Never Smoker    Smokeless tobacco: Never Used   Vaping Use    Vaping Use: Never used   Substance Use Topics    Alcohol use: No    Drug use: Never       Allergies   Allergen Reactions    Effient [Prasugrel] Swelling     Swelling in tongue, itching    Ranolazine Hives, Itching and Swelling     Can only take name brand Ranexa  Can not take generic    Azithromycin      Thrush on tongue    Dilaudid [Hydromorphone Hcl]      Severe head pain and ear pressure, very weak    Dolobid [Diflunisal] Nausea Only    Doxycycline      n  & v      Ranolazine Er Hives, Itching and Swelling     Can only take name brand Ranexa  Can not take generic    Vibramycin [Doxycycline Calcium]      Severe nausea and vomiting    Morphine Sulfate Nausea And Vomiting     Review of Systems:  · Constitutional: no unanticipated weight loss. There's been no change in energy level, sleep pattern, or activity level. No fevers, chills. · Eyes: No visual changes or diplopia. No scleral icterus. · ENT: No Headaches, hearing loss or vertigo. No mouth sores or sore throat. · Cardiovascular: as reviewed in HPI  · Respiratory: No cough or wheezing, no sputum production. No hematemesis. · Gastrointestinal: No abdominal pain, appetite loss, blood in stools. No change in bowel or bladder habits. · Genitourinary: No dysuria, trouble voiding, or hematuria. · Musculoskeletal:  No gait disturbance, no joint complaints. · Integumentary: No rash or pruritis. · Neurological: No headache, diplopia, change in muscle strength, numbness or tingling. · Psychiatric: No anxiety or depression. · Endocrine: No temperature intolerance. No excessive thirst, fluid intake, or urination. No tremor. · Hematologic/Lymphatic: No abnormal bruising or bleeding, blood clots or swollen lymph nodes. · Allergic/Immunologic: No nasal congestion or hives. Physical Exam:   /70   Pulse 56   Ht 5' 3\" (1.6 m)   Wt 171 lb (77.6 kg)   SpO2 99%   BMI 30.29 kg/m²   Wt Readings from Last 3 Encounters:   11/16/21 171 lb (77.6 kg)   06/16/20 167 lb (75.8 kg)   11/19/19 163 lb (73.9 kg)     Constitutional: She is oriented to person, place, and time. She appears well-developed and well-nourished. In no acute distress. Head: Normocephalic and atraumatic. Pupils equal and round. Neck: Neck supple. No JVP or carotid bruit appreciated. No mass and no thyromegaly present. No lymphadenopathy present. Cardiovascular: Normal rate.  Normal heart sounds. Exam reveals no gallop and no friction rub. No murmur heard. Pulmonary/Chest: Effort normal and breath sounds normal. No respiratory distress. She has no wheezes, rhonchi or rales. Abdominal: Soft, non-tender. Bowel sounds are normal. She exhibits no organomegaly, mass or bruit. Extremities: No edema, cyanosis, or clubbing. Pulses are 2+ radial/dorsalis pedis/posterior tibial/carotid bilaterally. Neurological: No gross cranial nerve deficit. Coordination normal.   Skin: Skin is warm and dry. There is no rash or diaphoresis. Psychiatric: She has a normal mood and affect. Her speech is normal and behavior is normal.     Current Outpatient Medications   Medication Sig Dispense Refill    rosuvastatin (CRESTOR) 40 MG tablet TAKE 1 TABLET BY MOUTH EVERY DAY 30 tablet 2    rosuvastatin (CRESTOR) 40 MG tablet TAKE 1 TABLET BY MOUTH EVERY DAY 30 tablet 0    clobetasol (TEMOVATE) 0.05 % ointment Apply thin application to affected area at night for four weeks, then every other night for four weeks, then twice weekly for four weeks 45 g 1    metFORMIN (GLUCOPHAGE) 500 MG tablet TAKE 1 TABLET BY MOUTH 2 (TWO) TIMES DAILY WITH MEALS. FOR DIABETES      saccharomyces boulardii (FLORASTOR) 250 MG capsule Take 250 mg by mouth 2 times daily      nystatin-triamcinolone (MYCOLOG) 459824-6.1 UNIT/GM-% ointment Apply topically 2 times daily.  60 g 1    clopidogrel (PLAVIX) 75 MG tablet TAKE 1 TABLET BY MOUTH EVERY DAY 90 tablet 3    lisinopril (PRINIVIL;ZESTRIL) 5 MG tablet TAKE 1 TABLET BY MOUTH EVERY DAY 90 tablet 3    ranolazine (RANEXA) 500 MG extended release tablet TAKE 1 TABLET BY MOUTH TWICE A  tablet 3    fenofibrate (TRIGLIDE) 160 MG tablet TAKE 1 TABLET BY MOUTH EVERY DAY 90 tablet 3    VASCEPA 1 g CAPS capsule TAKE 2 CAPSULES BY MOUTH TWICE A  capsule 6    nitroGLYCERIN (NITROSTAT) 0.4 MG SL tablet PLACE 1 TABLET UNDER THE TONGUE EVERY 5 MINUTES AS NEEDED FOR CHEST PAIN 25 tablet 6    glipiZIDE (GLUCOTROL) 5 MG tablet Take 5 mg by mouth every morning (before breakfast)      dapagliflozin (FARXIGA) 5 MG tablet Take 5 mg by mouth every morning      Blood Glucose Monitoring Suppl (TRUE METRIX METER) w/Device KIT 1 kit by Does not apply route daily 1 kit 0    blood glucose test strips (TRUE METRIX BLOOD GLUCOSE TEST) strip 1 each by In Vitro route daily As needed. 100 each 3    niacin 500 MG tablet Take 500 mg by mouth 3 times daily      Handicap Placard MISC by Does not apply route Handicap to  in 5 years for patient's history of CAD. Patient unable to walk more than 200 feet without stopping to rest. 1 each 0    metoprolol (LOPRESSOR) 25 MG tablet Take 1 tablet by mouth 2 times daily. 180 tablet 3    Multiple Vitamins-Minerals (CENTRUM SILVER PO) Take  by mouth daily.  aspirin EC 81 MG EC tablet Take 1 tablet by mouth daily. 30 tablet 3    omeprazole (PRILOSEC) 20 MG capsule Take 20 mg by mouth as needed.  promethazine (PHENERGAN) 25 MG tablet Take 25 mg by mouth every 6 hours as needed for Nausea.  meclizine (ANTIVERT) 25 MG tablet Take 25 mg by mouth 3 times daily as needed.  Omega-3 Fatty Acids (FISH OIL BURP-LESS) 1200 MG CAPS Take 2,400 mg by mouth.  cetirizine (ZYRTEC) 10 MG tablet Take 10 mg by mouth as needed. No current facility-administered medications for this visit.      Facility-Administered Medications Ordered in Other Visits   Medication Dose Route Frequency Provider Last Rate Last Admin    aminocaproic acid (AMICAR) 5 g in sodium chloride 0.9 % 250 mL IV bolus  5 g IntraVENous Once Conner Serrano MD        insulin regular (HUMULIN R;NOVOLIN R) 100 Units in sodium chloride 0.9 % 100 mL infusion  0.1 Units/kg/hr IntraVENous Once Conner Serrano MD        phenylephrine (COLIN-SYNEPHRINE) 50 mg in dextrose 5 % 250 mL infusion  100 mcg/min IntraVENous Once Conner Serraon MD        lactated ringers infusion   IntraVENous Once Scot Esparza MD Carolyn        pantoprazole (PROTONIX) injection 40 mg  40 mg IntraVENous Once Jaime Rowe MD           Lab Review:   Care Everywhere-10/2021 [facility] per Dr. Negin Adame. Lab Results   Component Value Date    TRIG 545 08/13/2020    HDL 26 08/13/2020    HDL 27 04/18/2012    LDLCALC see below 08/13/2020    LDLDIRECT 67 08/13/2020    LABVLDL see below 08/13/2020    10/27/16  Lab Results   Component Value Date    HGB 12.7 10/13/2019    HCT 39.2 10/13/2019       Lab Results   Component Value Date     08/13/2020      Lab Results   Component Value Date    K 4.2 08/13/2020    K 4.1 10/13/2019     Lab Results   Component Value Date    BUN 18 08/13/2020    CREATININE 1.0 08/13/2020     EKG Interpretation:   11/16/21 SR with non specific T abnormality, 63 bpm.     Image Review:     CABGx3 4/2011 at [facility] per Dr. Crockett Dry    Stress:10/10/2013  Summary   Normal myocardial perfusion study. Normal LV size and systolic function. ECG findings reported separately. Cath:3/27/2014  POSTOPERATIVE DIAGNOSES:  1. Stenosis 90% to 95% proximal, 40% to 50% mid and 80% distal right coronary artery stenosis with a competitive flow in the posterolateral and posterior descending artery branches. 2. Patent left main trunk. 3. Mild disease of the proximal left anterior descending and 100% mid left anterior descending occlusion. 4. Stenosis 50% to 60% of the proximal portion of the obtuse marginal branch and 80% stenosis of the mid portion of the obtuse marginal branch. 5. Saphenous vein graft to distal right coronary artery widely patent with mild aneurysmal dilatation of the saphenous vein graft. 6. Patent saphenous vein graft to the diagonal branch with evidence of mild aneurysmal dilatation. 7. Patent left internal mammary artery to the left anterior descending with diffuse disease of the left anterior descending beyond anastomosis.   8. Normal left ventricular systolic function with a _____ of 55% to 60% with normal left heart hemodynamics. 9. Patent femoral and iliac artery. 10. Successful angioplasty of the circumflex artery which was partially complicated by a proximal dissection requiring 2 Drug-Eluting stents with a final diameter of the proximal area of 2.42 and a distal area of 2.37. Stress test 7/20/18      1. Technically a satisfactory study.    2. Non diagnostic pharmacological stress portion of the study.    3. Moderate sized mild intensity reversible defect involving the inferior    wall that may suggest ischemia. Bowel adjacent to inferior wall may affect    the accuracy of the study.    4. Gated Study shows normal LV size and Systolic function; EF is 70 %. Assessment/Plan:     Coronary artery disease  -She denies any symptoms of angina with good exercise capacity. EKG today SR with non specific T abnormality, 63 bpm.   -Pt is on BBlocker, Statin, ACEI, dual antiplatelet therapy. Hypertension, essential  Blood pressure is stable today. BMP from 10/19/21 [facility].       Hyperlipidemia, essential   Continue Crestor 40 mg daily, fenofibrate, niacin and Vascepa. 8/13/20 HDL 26, . Working with Dr. Jeanne Ham to gain control of her DM-II  Work on avoiding processed foods. CVA  Hospitalized 10/12/19-10/14/19. Continue Plavix. Denies any recurrence since our last visit. DM-II   Managed per PCP  Medication changes recent to gain control of her DM. Discussed elevated TRG with avoiding processed foods. She has obtained influenza vaccination, encouraged her to obtain COVID vaccine then booster. All questions and concerns answered prior to leaving. Follow up between 6-7 months. Complexity of medical decision making-high      Thank you very much for allowing me to participate in the care of your patient. Please do not hesitate to contact me if you have any questions.     Sincerely,  Manoj Osman MD    [address] [place] [id number]  Ph: [phone]  Fax: [phone]    This note was scribed in the presence of Dr. Sebastien Shaffer MD by Avelino Cam RN.

## 2021-11-16 ENCOUNTER — OFFICE VISIT (OUTPATIENT)
Dept: CARDIOLOGY CLINIC | Age: 71
End: 2021-11-16
Payer: MEDICARE

## 2021-11-16 VITALS
SYSTOLIC BLOOD PRESSURE: 118 MMHG | BODY MASS INDEX: 30.3 KG/M2 | OXYGEN SATURATION: 99 % | WEIGHT: 171 LBS | HEART RATE: 56 BPM | DIASTOLIC BLOOD PRESSURE: 70 MMHG | HEIGHT: 63 IN

## 2021-11-16 DIAGNOSIS — I25.10 CORONARY ARTERY DISEASE INVOLVING NATIVE CORONARY ARTERY OF NATIVE HEART WITHOUT ANGINA PECTORIS: ICD-10-CM

## 2021-11-16 DIAGNOSIS — E78.5 HYPERLIPIDEMIA, UNSPECIFIED HYPERLIPIDEMIA TYPE: ICD-10-CM

## 2021-11-16 DIAGNOSIS — I10 ESSENTIAL HYPERTENSION: Primary | ICD-10-CM

## 2021-11-16 PROCEDURE — 3017F COLORECTAL CA SCREEN DOC REV: CPT | Performed by: INTERNAL MEDICINE

## 2021-11-16 PROCEDURE — 1123F ACP DISCUSS/DSCN MKR DOCD: CPT | Performed by: INTERNAL MEDICINE

## 2021-11-16 PROCEDURE — 1036F TOBACCO NON-USER: CPT | Performed by: INTERNAL MEDICINE

## 2021-11-16 PROCEDURE — G8427 DOCREV CUR MEDS BY ELIG CLIN: HCPCS | Performed by: INTERNAL MEDICINE

## 2021-11-16 PROCEDURE — 1090F PRES/ABSN URINE INCON ASSESS: CPT | Performed by: INTERNAL MEDICINE

## 2021-11-16 PROCEDURE — G8417 CALC BMI ABV UP PARAM F/U: HCPCS | Performed by: INTERNAL MEDICINE

## 2021-11-16 PROCEDURE — 4040F PNEUMOC VAC/ADMIN/RCVD: CPT | Performed by: INTERNAL MEDICINE

## 2021-11-16 PROCEDURE — 99214 OFFICE O/P EST MOD 30 MIN: CPT | Performed by: INTERNAL MEDICINE

## 2021-11-16 PROCEDURE — 93000 ELECTROCARDIOGRAM COMPLETE: CPT | Performed by: INTERNAL MEDICINE

## 2021-11-16 PROCEDURE — G8400 PT W/DXA NO RESULTS DOC: HCPCS | Performed by: INTERNAL MEDICINE

## 2021-11-16 PROCEDURE — G8484 FLU IMMUNIZE NO ADMIN: HCPCS | Performed by: INTERNAL MEDICINE

## 2021-11-16 NOTE — PATIENT INSTRUCTIONS
Patient Education        Learning About High Triglycerides  What are high triglycerides? Triglycerides are a type of fat in your blood. Your body uses them for energy. You need some for good health. But high triglyceride levels are linked with a higher risk of coronary artery disease. A high level may be a sign of metabolic syndrome. Very high levels raise your risk of pancreatitis. What causes them? High triglycerides can run in families. They may also be caused by other conditions, like obesity and diabetes. You may have high levels of this fat if you eat or drink too many foods or drinks with added sugar or if you drink a lot of alcohol. And some medicines can cause this condition. What are their symptoms? High triglycerides usually don't cause symptoms. But if the condition is genetic, you may see fatty bumps under your skin. How are they diagnosed? A blood test is used to measure triglycerides. It's most accurate if it's done after you go without food or drink for 9 to 14 hours (fasting). Triglyceride levels are:  · Normal when they are less than 150 mg/dL. · Moderately high when they are 150 to 499 mg/dL. · Very high when they are 500 mg/dL or higher. How are they treated? A healthy lifestyle can help lower your triglycerides and your risk of coronary artery disease. It includes losing weight, being active, limiting high-sugar foods and drinks, and limiting alcohol. Your doctor may recommend that you also take medicine. Your doctor will treat other health problems if they are causing high levels. How do you care for yourself when you have high triglycerides? A healthy diet and lifestyle can help lower your triglycerides level and lower your risk of coronary artery disease. · Lose weight, and stay at a healthy weight. Triglycerides are stored as fat in your tissues and muscles. · Limit foods and drinks that have a lot of sugar.    These include sugar-sweetened desserts, soda pop, and fruit juice.  · Limit saturated fats. These are found in animal-based foods like meat, butter, milk, and cheese. They are also found in coconut oil, palm oil, and cocoa butter. · Choose a heart-healthy eating plan. Eat a diet that's rich in vegetables, whole grains, fish, lean meats, and low-fat or nonfat dairy foods. Eating oily fish may lower your levels. These fish include salmon, mackerel, lake trout, herring, and sardines. · Limit or avoid alcohol. Limit alcohol to 2 drinks a day for men and 1 drink a day for women. Alcohol has a strong effect on triglycerides. · Be active on most days of the week. Before you start to be more active, check with your doctor to be sure it's safe. Try to do moderate activity at least 2½ hours a week. Or try to do vigorous activity at least 1¼ hours a week. · If you have diabetes, keep your blood sugar in your target range. · Don't smoke. If you need help quitting, talk to your doctor about stop-smoking programs and medicines. These can increase your chances of quitting for good. · Take your medicines exactly as prescribed. Call your doctor if you think you are having a problem with your medicine. Where can you learn more? Go to https://[email or web address] Pay.Movidius. org and sign in to your Netviewer account. Enter T123 in the [employer] box to learn more about \"Learning About High Triglycerides. \"     If you do not have an account, please click on the \"Sign Up Now\" link. Current as of: April 29, 2021               Content Version: 13.0  © 2006-2021 Healthwise, Incorporated. Care instructions adapted under license by [facility] ([facility]). If you have questions about a medical condition or this instruction, always ask your healthcare professional. [place] [address] any warranty or liability for your use of this information. Patient Education        Learning About Meal Planning for Diabetes  Why plan your meals?      Meal planning can be a key part of managing diabetes. Planning meals and snacks with the right balance of carbohydrate, protein, and fat can help you keep your blood sugar at the target level you set with your doctor. You don't have to eat special foods. You can eat what your family eats, including sweets once in a while. But you do have to pay attention to how often you eat and how much you eat of certain foods. You may want to work with a dietitian or a certified diabetes educator. He or she can give you tips and meal ideas and can answer your questions about meal planning. This health professional can also help you reach a healthy weight if that is one of your goals. What plan is right for you? Your dietitian or diabetes educator may suggest that you start with the plate format or carbohydrate counting. The plate format  The plate format is a simple way to help you manage how you eat. You plan meals by learning how much space each food should take on a plate. Using the plate format helps you spread carbohydrate throughout the day. It can make it easier to keep your blood sugar level within your target range. It also helps you see if you're eating healthy portion sizes. To use the plate format, you put non-starchy vegetables on half your plate. Add meat or meat substitutes on one-quarter of the plate. Put a grain or starchy vegetable (such as brown rice or a potato) on the final quarter of the plate. You can add a small piece of fruit and some low-fat or fat-free milk or yogurt, depending on your carbohydrate goal for each meal.  Here are some tips for using the plate format:  · Make sure that you are not using an oversized plate. A 9-inch plate is best. Many restaurants use larger plates. · Get used to using the plate format at home. Then you can use it when you eat out. · Write down your questions about using the plate format. Talk to your doctor, a dietitian, or a diabetes educator about your concerns.   Carbohydrate counting  With carbohydrate counting, you plan meals based on the amount of carbohydrate in each food. Carbohydrate raises blood sugar higher and more quickly than any other nutrient. It is found in desserts, breads and cereals, and fruit. It's also found in starchy vegetables such as potatoes and corn, grains such as rice and pasta, and milk and yogurt. Spreading carbohydrate throughout the day helps keep your blood sugar levels within your target range. Your daily amount depends on several things, including your weight, how active you are, which diabetes medicines you take, and what your goals are for your blood sugar levels. A registered dietitian or diabetes educator can help you plan how much carbohydrate to include in each meal and snack. A guideline for your daily amount of carbohydrate is:  · 45 to 60 grams at each meal. That's about the same as 3 to 4 carbohydrate servings. · 15 to 20 grams at each snack. That's about the same as 1 carbohydrate serving. The Nutrition Facts label on packaged foods tells you how much carbohydrate is in a serving of the food. First, look at the serving size on the food label. Is that the amount you eat in a serving? All of the nutrition information on a food label is based on that serving size. So if you eat more or less than that, you'll need to adjust the other numbers. Total carbohydrate is the next thing you need to look for on the label. If you count carbohydrate servings, one serving of carbohydrate is 15 grams. For foods that don't come with labels, such as fresh fruits and vegetables, you'll need a guide that lists carbohydrate in these foods. Ask your doctor, dietitian, or diabetes educator about books or other nutrition guides you can use. If you take insulin, you need to know how many grams of carbohydrate are in a meal. This lets you know how much rapid-acting insulin to take before you eat.  If you use an insulin pump, you get a constant rate of insulin during the day. So the pump must be programmed at meals to give you extra insulin to cover the rise in blood sugar after meals. When you know how much carbohydrate you will eat, you can take the right amount of insulin. Or, if you always use the same amount of insulin, you need to make sure that you eat the same amount of carbohydrate at meals. If you need more help to understand carbohydrate counting and food labels, ask your doctor, dietitian, or diabetes educator. How can you plan healthy meals? Here are some tips to get started:  · Plan your meals a week at a time. Don't forget to include snacks too. · Use cookbooks or online recipes to plan several main meals. Plan some quick meals for busy nights. You also can double some recipes that freeze well. Then you can save half for other busy nights when you don't have time to cook. · Make sure you have the ingredients you need for your recipes. If you're running low on basic items, put these items on your shopping list too. · List foods that you use to make breakfasts, lunches, and snacks. List plenty of fruits and vegetables. · Post this list on the refrigerator. Add to it as you think of more things you need. · Take the list to the store to do your weekly shopping. Follow-up care is a key part of your treatment and safety. Be sure to make and go to all appointments, and call your doctor if you are having problems. It's also a good idea to know your test results and keep a list of the medicines you take. Where can you learn more? Go to [email or web address].[email or web address] and sign in to your Dragon[employer] Studios account. Enter Q004 in the [facility] box to learn more about \"Learning About Meal Planning for Diabetes. \"     If you do not have an account, please click on the \"Sign Up Now\" link. Current as of: December 17, 2020               Content Version: 13.0  © 9136-5543 Healthwise, Incorporated.    Care instructions adapted under license by [facility]. If you have questions about a medical condition or this instruction, always ask your healthcare professional. [address] any warranty or liability for your use of this information. Patient Education        Walking for Exercise: Care Instructions  Your Care Instructions     Walking is one of the easiest ways to get the exercise you need for good health. A brisk, 30-minute walk each day can help you feel better and have more energy. It can help you lower your risk of disease. Walking can help you keep your bones strong and your heart healthy. Check with your doctor before you start a walking plan if you have heart problems, other health issues, or you have not been active in a long time. Follow your doctor's instructions for safe levels of exercise. Follow-up care is a key part of your treatment and safety. Be sure to make and go to all appointments, and call your doctor if you are having problems. It's also a good idea to know your test results and keep a list of the medicines you take. How can you care for yourself at home? Getting started  · Start slowly and set a short-term goal. For example, walk for 5 or 10 minutes every day. · Bit by bit, increase the amount you walk every day. Try for at least 30 minutes on most days of the week. You also may want to swim, bike, or do other activities. · If finding enough time is a problem, it's fine to be active in shorter periods of time throughout your day. · To get the heart-healthy benefits of walking, you need to walk briskly enough to increase your heart rate and breathing, but not so fast that you can't talk comfortably. · Wear comfortable shoes that fit well and provide good support for your feet and ankles. Staying with your plan  · After you've made walking a habit, set a longer-term goal. You may want to set a goal of walking briskly for longer or walking farther.  Experts say to do 2½ hours (150 minutes) of moderate activity a week. A faster heartbeat is what defines moderate-level activity. · To stay motivated, walk with friends, coworkers, or pets. · Use a phone lilia or pedometer to track your steps each day. Set a goal to increase your steps. When you reach that goal, set a higher goal.  · If the weather keeps you from walking outside, go for walks at the mall with a friend. Local schools and churches may have indoor gyms where you can walk. Fitting a walk into your workday  · Park several blocks away from work, or get off the bus a few stops early. · Use the stairs instead of the elevator, at least for a few floors. · Suggest holding meetings with colleagues during a walk inside or outside the building. · Use the restroom that is the farthest from your desk or workstation. · Use your morning and afternoon breaks to take quick 15 minutes walks. Staying safe  · Know your surroundings. Walk in a well-lighted, safe place. If it's dark, walk with a partner. Wear light-colored clothing. If you can, buy a vest or jacket that reflects light. · Carry a cell phone for emergencies. · Drink plenty of water. Take a water bottle with you when you walk. This is very important if it is hot out. · Be careful not to slip on wet or icy ground. You can buy \"grippers\" for your shoes to help keep you from slipping. · Pay attention to your walking surface. Use sidewalks and paths. · If you have health issues such as asthma, COPD, or heart problems, or if you haven't been active for a long time, check with your doctor before you start a new activity. Where can you learn more? Go to https://Connect [email or web address] [email or web address] org and sign in to your [employer] account. Enter R159 in the [employer] box to learn more about \"Walking for Exercise: Care Instructions. \"     If you do not have an account, please click on the \"Sign Up Now\" link. Current as of:  May 12, 2021               Content Version: 13.0  © 5176-8840 Healthwise Incorporated. Care instructions adapted under license by [facility] ([facility]). If you have questions about a medical condition or this instruction, always ask your healthcare professional. [address] any warranty or liability for your use of this information. Patient Education        Learning About Low-Sodium Foods  What foods are low in sodium? The foods you eat contain nutrients, such as vitamins and minerals. Sodium is a nutrient. Your body needs the right amount to stay healthy and work as it should. You can use the list below to help you make choices about which foods to eat. Fruits  · Fresh, frozen, canned, or dried fruit  Vegetables  · Fresh or frozen vegetables, with no added salt  · Canned vegetables, low-sodium or with no added salt  Grains  · Bagels without salted tops  · Cereal with no added salt  · Corn tortillas  · Crackers with no added salt  · Oatmeal, cooked without salt  · Popcorn with no salt  · Pasta and noodles, cooked without salt  · Rice, cooked without salt  · Unsalted pretzels  Dairy and dairy alternatives  · Butter, unsalted  · Cream cheese  · Ice cream  · Milk  · Soy milk  Meats and other protein foods  · Beans and peas, canned with no salt  · Eggs  · Fresh fish (not smoked)  · Fresh meats (not smoked or cured)  · Nuts and nut butter, prepared without salt  · Poultry, not packaged with sodium solution  · Tofu, unseasoned  · Tuna, canned without salt  Seasonings  · Garlic  · Herbs and spices  · Lemon juice  · Mustard  · Olive oil  · Salt-free seasoning mixes  · Vinegar  Work with your doctor to find out how much of this nutrient you need. Depending on your health, you may need more or less of it in your diet. Where can you learn more? Go to [email or web address] [email or web address] org and sign in to your [employer] account. Enter D933 in the CompleteSet box to learn more about \"Learning About Low-Sodium Foods. \"     If you do not have an account, please click on the \"Sign Up Now\" link. Current as of: December 17, 2020               Content Version: 13.0  © 2006-2021 [employer]. Care instructions adapted under license by [facility] ([facility]). If you have questions about a medical condition or this instruction, always ask your healthcare professional. [address] any warranty or liability for your use of this information. Patient Education        Heart-Healthy Diet: Care Instructions  Your Care Instructions     A heart-healthy diet has lots of vegetables, fruits, nuts, beans, and whole grains, and is low in salt. It limits foods that are high in saturated fat, such as meats, cheeses, and fried foods. It may be hard to change your diet, but even small changes can lower your risk of heart attack and heart disease. Follow-up care is a key part of your treatment and safety. Be sure to make and go to all appointments, and call your doctor if you are having problems. It's also a good idea to know your test results and keep a list of the medicines you take. How can you care for yourself at home? Watch your portions  · Use food labels to learn what the recommended servings are for the foods you eat. · Eat only the number of calories you need to stay at a healthy weight. If you need to lose weight, eat fewer calories than your body burns (through exercise and other physical activity). Eat more fruits and vegetables  · Eat a variety of fruit and vegetables every day. Dark green, deep orange, red, or yellow fruits and vegetables are especially good for you. Examples include spinach, carrots, peaches, and berries. · Keep carrots, celery, and other veggies handy for snacks. Buy fruit that is in season and store it where you can see it so that you will be tempted to eat it. · Cook dishes that have a lot of veggies in them, such as stir-fries and soups. Limit saturated fat  · Read food labels, and try to avoid saturated fats.  They increase your risk of heart disease. · Use olive or canola oil when you cook. · Bake, broil, grill, or steam foods instead of frying them. · Choose lean meats instead of high-fat meats such as hot dogs and sausages. Cut off all visible fat when you prepare meat. · Eat fish, skinless poultry, and meat alternatives such as soy products instead of high-fat meats. Soy products, such as tofu, may be especially good for your heart. · Choose low-fat or fat-free milk and dairy products. Eat foods high in fiber  · Eat a variety of grain products every day. Include whole-grain foods that have lots of fiber and nutrients. Examples of whole-grain foods include oats, whole wheat bread, and brown rice. · Buy whole-grain breads and cereals, instead of white bread or pastries. Limit salt and sodium  · Limit how much salt and sodium you eat to help lower your blood pressure. · Taste food before you salt it. Add only a little salt when you think you need it. With time, your taste buds will adjust to less salt. · Eat fewer snack items, fast foods, and other high-salt, processed foods. Check food labels for the amount of sodium in packaged foods. · Choose low-sodium versions of canned goods (such as soups, vegetables, and beans). Limit sugar  · Limit drinks and foods with added sugar. These include candy, desserts, and soda pop. Limit alcohol  · Limit alcohol to no more than 2 drinks a day for men and 1 drink a day for women. Too much alcohol can cause health problems. When should you call for help? Watch closely for changes in your health, and be sure to contact your doctor if:    · You would like help planning heart-healthy meals. Where can you learn more? Go to [email or web address]://[email or web address].healthFave [email or web address] and sign in to your Myxer account. Enter V137 in the Rise box to learn more about \"Heart-Healthy Diet: Care Instructions. \"     If you do not have an account, please click on the \"Sign Up Now\" link.  Current as of: December 17, 2020               Content Version: 13.0  © 8860-5926 [employer], Incorporated. Care instructions adapted under license by [facility] ([facility]). If you have questions about a medical condition or this instruction, always ask your healthcare professional. [address] any warranty or liability for your use of this information.

## 2021-11-18 RX ORDER — ROSUVASTATIN CALCIUM 40 MG/1
TABLET, COATED ORAL
Qty: 90 TABLET | Refills: 2 | Status: SHIPPED | OUTPATIENT
Start: 2021-11-18 | End: 2022-01-03 | Stop reason: SDUPTHER

## 2021-11-18 NOTE — TELEPHONE ENCOUNTER
Last OV: 11/16/21  Next OV: 7 MTH F/U 6/2022  Last refill:10/25/21  Most recent Labs: 8/13/20  Last EKG (if needed):11/16/21

## 2021-12-10 RX ORDER — FENOFIBRATE 160 MG/1
TABLET ORAL
Qty: 90 TABLET | Refills: 3 | Status: SHIPPED | OUTPATIENT
Start: 2021-12-10 | End: 2022-01-03 | Stop reason: SDUPTHER

## 2021-12-10 NOTE — TELEPHONE ENCOUNTER
Last ov 11/16/21  Pending appt due in june  Last refill 12/17/20 #[id number]  Last labs 10/19/21 cmp, lipid in care everywhere

## 2021-12-28 ENCOUNTER — APPOINTMENT (OUTPATIENT)
Dept: CT IMAGING | Age: 71
DRG: 392 | End: 2021-12-28
Payer: MEDICARE

## 2021-12-28 ENCOUNTER — HOSPITAL ENCOUNTER (INPATIENT)
Age: 71
LOS: 3 days | Discharge: HOME | DRG: 392 | End: 2021-12-31
Attending: INTERNAL MEDICINE | Admitting: INTERNAL MEDICINE
Payer: MEDICARE

## 2021-12-28 ENCOUNTER — APPOINTMENT (OUTPATIENT)
Dept: GENERAL RADIOLOGY | Age: 71
DRG: 392 | End: 2021-12-28
Payer: MEDICARE

## 2021-12-28 ENCOUNTER — TELEPHONE (OUTPATIENT)
Dept: CARDIOLOGY CLINIC | Age: 71
End: 2021-12-28

## 2021-12-28 DIAGNOSIS — K85.90 ACUTE PANCREATITIS, UNSPECIFIED COMPLICATION STATUS, UNSPECIFIED PANCREATITIS TYPE: Primary | ICD-10-CM

## 2021-12-28 PROBLEM — Z87.19 HX OF ACUTE PANCREATITIS: Status: ACTIVE | Noted: 2021-12-28

## 2021-12-28 LAB
A/G RATIO: 1.2 (ref 1.1–2.2)
ALBUMIN SERPL-MCNC: 4.3 G/DL (ref 3.4–5)
ALP BLD-CCNC: 63 U/L (ref 40–129)
ALT SERPL-CCNC: 25 U/L (ref 10–40)
ANION GAP SERPL CALCULATED.3IONS-SCNC: 13 MMOL/L (ref 3–16)
AST SERPL-CCNC: 18 U/L (ref 15–37)
BASOPHILS ABSOLUTE: 0 K/UL (ref 0–0.2)
BASOPHILS RELATIVE PERCENT: 0.9 %
BILIRUB SERPL-MCNC: 0.4 MG/DL (ref 0–1)
BILIRUBIN URINE: ABNORMAL
BLOOD, URINE: NEGATIVE
BUN BLDV-MCNC: 39 MG/DL (ref 7–20)
CALCIUM SERPL-MCNC: 9.5 MG/DL (ref 8.3–10.6)
CHLORIDE BLD-SCNC: 98 MMOL/L (ref 99–110)
CLARITY: ABNORMAL
CO2: 22 MMOL/L (ref 21–32)
COLOR: ABNORMAL
COMMENT UA: ABNORMAL
CREAT SERPL-MCNC: 1.1 MG/DL (ref 0.6–1.2)
CRYSTALS, UA: ABNORMAL /HPF
EKG ATRIAL RATE: 98 BPM
EKG DIAGNOSIS: NORMAL
EKG P AXIS: 80 DEGREES
EKG P-R INTERVAL: 130 MS
EKG Q-T INTERVAL: 364 MS
EKG QRS DURATION: 86 MS
EKG QTC CALCULATION (BAZETT): 464 MS
EKG R AXIS: 54 DEGREES
EKG T AXIS: 47 DEGREES
EKG VENTRICULAR RATE: 98 BPM
EOSINOPHILS ABSOLUTE: 0.1 K/UL (ref 0–0.6)
EOSINOPHILS RELATIVE PERCENT: 1.4 %
EPITHELIAL CELLS, UA: 5 /HPF (ref 0–5)
GFR AFRICAN AMERICAN: 59
GFR NON-AFRICAN AMERICAN: 49
GLUCOSE BLD-MCNC: 187 MG/DL (ref 70–99)
GLUCOSE BLD-MCNC: 235 MG/DL (ref 70–99)
GLUCOSE URINE: >=1000 MG/DL
HCT VFR BLD CALC: 44.5 % (ref 36–48)
HEMOGLOBIN: 14.4 G/DL (ref 12–16)
HYALINE CASTS: ABNORMAL /LPF (ref 0–2)
KETONES, URINE: NEGATIVE MG/DL
LEUKOCYTE ESTERASE, URINE: NEGATIVE
LIPASE: 258 U/L (ref 13–60)
LYMPHOCYTES ABSOLUTE: 1.2 K/UL (ref 1–5.1)
LYMPHOCYTES RELATIVE PERCENT: 27.5 %
MCH RBC QN AUTO: 27.2 PG (ref 26–34)
MCHC RBC AUTO-ENTMCNC: 32.3 G/DL (ref 31–36)
MCV RBC AUTO: 84.2 FL (ref 80–100)
MICROSCOPIC EXAMINATION: YES
MONOCYTES ABSOLUTE: 0.7 K/UL (ref 0–1.3)
MONOCYTES RELATIVE PERCENT: 16.6 %
NEUTROPHILS ABSOLUTE: 2.3 K/UL (ref 1.7–7.7)
NEUTROPHILS RELATIVE PERCENT: 53.6 %
NITRITE, URINE: NEGATIVE
PDW BLD-RTO: 14 % (ref 12.4–15.4)
PERFORMED ON: ABNORMAL
PH UA: 5.5 (ref 5–8)
PLATELET # BLD: 234 K/UL (ref 135–450)
PMV BLD AUTO: 8.8 FL (ref 5–10.5)
POTASSIUM REFLEX MAGNESIUM: 4.1 MMOL/L (ref 3.5–5.1)
PROTEIN UA: ABNORMAL MG/DL
RBC # BLD: 5.28 M/UL (ref 4–5.2)
RBC UA: 6 /HPF (ref 0–4)
SODIUM BLD-SCNC: 133 MMOL/L (ref 136–145)
SPECIFIC GRAVITY UA: 1.03 (ref 1–1.03)
TOTAL PROTEIN: 8 G/DL (ref 6.4–8.2)
TROPONIN: <0.01 NG/ML
URINE REFLEX TO CULTURE: ABNORMAL
URINE TYPE: ABNORMAL
UROBILINOGEN, URINE: 0.2 E.U./DL
WBC # BLD: 4.3 K/UL (ref 4–11)
WBC UA: 2 /HPF (ref 0–5)

## 2021-12-28 PROCEDURE — 6370000000 HC RX 637 (ALT 250 FOR IP): Performed by: INTERNAL MEDICINE

## 2021-12-28 PROCEDURE — 84484 ASSAY OF TROPONIN QUANT: CPT

## 2021-12-28 PROCEDURE — 6360000004 HC RX CONTRAST MEDICATION: Performed by: PHYSICIAN ASSISTANT

## 2021-12-28 PROCEDURE — 71045 X-RAY EXAM CHEST 1 VIEW: CPT

## 2021-12-28 PROCEDURE — 83690 ASSAY OF LIPASE: CPT

## 2021-12-28 PROCEDURE — 81001 URINALYSIS AUTO W/SCOPE: CPT

## 2021-12-28 PROCEDURE — 6360000002 HC RX W HCPCS: Performed by: INTERNAL MEDICINE

## 2021-12-28 PROCEDURE — 6360000002 HC RX W HCPCS: Performed by: PHYSICIAN ASSISTANT

## 2021-12-28 PROCEDURE — 93010 ELECTROCARDIOGRAM REPORT: CPT | Performed by: INTERNAL MEDICINE

## 2021-12-28 PROCEDURE — 99283 EMERGENCY DEPT VISIT LOW MDM: CPT

## 2021-12-28 PROCEDURE — 96374 THER/PROPH/DIAG INJ IV PUSH: CPT

## 2021-12-28 PROCEDURE — 1200000000 HC SEMI PRIVATE

## 2021-12-28 PROCEDURE — 74177 CT ABD & PELVIS W/CONTRAST: CPT

## 2021-12-28 PROCEDURE — 2580000003 HC RX 258: Performed by: INTERNAL MEDICINE

## 2021-12-28 PROCEDURE — 2580000003 HC RX 258: Performed by: PHYSICIAN ASSISTANT

## 2021-12-28 PROCEDURE — 93005 ELECTROCARDIOGRAM TRACING: CPT | Performed by: PHYSICIAN ASSISTANT

## 2021-12-28 PROCEDURE — 80053 COMPREHEN METABOLIC PANEL: CPT

## 2021-12-28 PROCEDURE — 85025 COMPLETE CBC W/AUTO DIFF WBC: CPT

## 2021-12-28 RX ORDER — MAGNESIUM SULFATE IN WATER 40 MG/ML
2000 INJECTION, SOLUTION INTRAVENOUS PRN
Status: DISCONTINUED | OUTPATIENT
Start: 2021-12-28 | End: 2021-12-31 | Stop reason: HOSPADM

## 2021-12-28 RX ORDER — MECLIZINE HCL 12.5 MG/1
25 TABLET ORAL 3 TIMES DAILY PRN
Status: DISCONTINUED | OUTPATIENT
Start: 2021-12-28 | End: 2021-12-31 | Stop reason: HOSPADM

## 2021-12-28 RX ORDER — NITROGLYCERIN 0.4 MG/1
0.4 TABLET SUBLINGUAL EVERY 5 MIN PRN
Status: DISCONTINUED | OUTPATIENT
Start: 2021-12-28 | End: 2021-12-31 | Stop reason: HOSPADM

## 2021-12-28 RX ORDER — ACETAMINOPHEN 650 MG/1
650 SUPPOSITORY RECTAL EVERY 6 HOURS PRN
Status: DISCONTINUED | OUTPATIENT
Start: 2021-12-28 | End: 2021-12-31 | Stop reason: HOSPADM

## 2021-12-28 RX ORDER — ACETAMINOPHEN 325 MG/1
650 TABLET ORAL EVERY 6 HOURS PRN
Status: DISCONTINUED | OUTPATIENT
Start: 2021-12-28 | End: 2021-12-31 | Stop reason: HOSPADM

## 2021-12-28 RX ORDER — NICOTINE POLACRILEX 4 MG
15 LOZENGE BUCCAL PRN
Status: DISCONTINUED | OUTPATIENT
Start: 2021-12-28 | End: 2021-12-31 | Stop reason: HOSPADM

## 2021-12-28 RX ORDER — LACTOBACILLUS RHAMNOSUS GG 10B CELL
1 CAPSULE ORAL
Status: DISCONTINUED | OUTPATIENT
Start: 2021-12-29 | End: 2021-12-31 | Stop reason: HOSPADM

## 2021-12-28 RX ORDER — POTASSIUM CHLORIDE 7.45 MG/ML
10 INJECTION INTRAVENOUS PRN
Status: DISCONTINUED | OUTPATIENT
Start: 2021-12-28 | End: 2021-12-31 | Stop reason: HOSPADM

## 2021-12-28 RX ORDER — SODIUM CHLORIDE 9 MG/ML
INJECTION, SOLUTION INTRAVENOUS CONTINUOUS
Status: DISCONTINUED | OUTPATIENT
Start: 2021-12-28 | End: 2021-12-29

## 2021-12-28 RX ORDER — DEXTROSE MONOHYDRATE 50 MG/ML
100 INJECTION, SOLUTION INTRAVENOUS PRN
Status: DISCONTINUED | OUTPATIENT
Start: 2021-12-28 | End: 2021-12-31 | Stop reason: HOSPADM

## 2021-12-28 RX ORDER — SODIUM CHLORIDE 0.9 % (FLUSH) 0.9 %
10 SYRINGE (ML) INJECTION PRN
Status: DISCONTINUED | OUTPATIENT
Start: 2021-12-28 | End: 2021-12-31 | Stop reason: HOSPADM

## 2021-12-28 RX ORDER — POTASSIUM CHLORIDE 20 MEQ/1
40 TABLET, EXTENDED RELEASE ORAL PRN
Status: DISCONTINUED | OUTPATIENT
Start: 2021-12-28 | End: 2021-12-31 | Stop reason: HOSPADM

## 2021-12-28 RX ORDER — ROSUVASTATIN CALCIUM 40 MG/1
40 TABLET, COATED ORAL DAILY
Status: DISCONTINUED | OUTPATIENT
Start: 2021-12-28 | End: 2021-12-31 | Stop reason: HOSPADM

## 2021-12-28 RX ORDER — CETIRIZINE HYDROCHLORIDE 10 MG/1
10 TABLET ORAL DAILY PRN
Status: DISCONTINUED | OUTPATIENT
Start: 2021-12-28 | End: 2021-12-31 | Stop reason: HOSPADM

## 2021-12-28 RX ORDER — ONDANSETRON 2 MG/ML
4 INJECTION INTRAMUSCULAR; INTRAVENOUS ONCE
Status: COMPLETED | OUTPATIENT
Start: 2021-12-28 | End: 2021-12-28

## 2021-12-28 RX ORDER — SACCHAROMYCES BOULARDII 250 MG
250 CAPSULE ORAL 2 TIMES DAILY
Status: DISCONTINUED | OUTPATIENT
Start: 2021-12-28 | End: 2021-12-28

## 2021-12-28 RX ORDER — RANOLAZINE 500 MG/1
500 TABLET, EXTENDED RELEASE ORAL 2 TIMES DAILY
Status: DISCONTINUED | OUTPATIENT
Start: 2021-12-28 | End: 2021-12-31 | Stop reason: HOSPADM

## 2021-12-28 RX ORDER — SODIUM CHLORIDE 9 MG/ML
25 INJECTION, SOLUTION INTRAVENOUS PRN
Status: DISCONTINUED | OUTPATIENT
Start: 2021-12-28 | End: 2021-12-31 | Stop reason: HOSPADM

## 2021-12-28 RX ORDER — ASPIRIN 81 MG/1
81 TABLET ORAL DAILY
Status: DISCONTINUED | OUTPATIENT
Start: 2021-12-28 | End: 2021-12-31 | Stop reason: HOSPADM

## 2021-12-28 RX ORDER — ONDANSETRON 2 MG/ML
4 INJECTION INTRAMUSCULAR; INTRAVENOUS EVERY 6 HOURS PRN
Status: DISCONTINUED | OUTPATIENT
Start: 2021-12-28 | End: 2021-12-31 | Stop reason: HOSPADM

## 2021-12-28 RX ORDER — 0.9 % SODIUM CHLORIDE 0.9 %
1000 INTRAVENOUS SOLUTION INTRAVENOUS ONCE
Status: COMPLETED | OUTPATIENT
Start: 2021-12-28 | End: 2021-12-28

## 2021-12-28 RX ORDER — CLOPIDOGREL BISULFATE 75 MG/1
75 TABLET ORAL DAILY
Status: DISCONTINUED | OUTPATIENT
Start: 2021-12-28 | End: 2021-12-31 | Stop reason: HOSPADM

## 2021-12-28 RX ORDER — DEXTROSE MONOHYDRATE 25 G/50ML
12.5 INJECTION, SOLUTION INTRAVENOUS PRN
Status: DISCONTINUED | OUTPATIENT
Start: 2021-12-28 | End: 2021-12-31 | Stop reason: HOSPADM

## 2021-12-28 RX ORDER — LISINOPRIL 5 MG/1
5 TABLET ORAL DAILY
Status: DISCONTINUED | OUTPATIENT
Start: 2021-12-28 | End: 2021-12-31 | Stop reason: HOSPADM

## 2021-12-28 RX ORDER — PANTOPRAZOLE SODIUM 40 MG/1
40 TABLET, DELAYED RELEASE ORAL
Status: DISCONTINUED | OUTPATIENT
Start: 2021-12-29 | End: 2021-12-31 | Stop reason: HOSPADM

## 2021-12-28 RX ORDER — PROMETHAZINE HYDROCHLORIDE 25 MG/1
12.5 TABLET ORAL EVERY 6 HOURS PRN
Status: DISCONTINUED | OUTPATIENT
Start: 2021-12-28 | End: 2021-12-31 | Stop reason: HOSPADM

## 2021-12-28 RX ORDER — SODIUM CHLORIDE 0.9 % (FLUSH) 0.9 %
10 SYRINGE (ML) INJECTION EVERY 12 HOURS SCHEDULED
Status: DISCONTINUED | OUTPATIENT
Start: 2021-12-28 | End: 2021-12-31 | Stop reason: HOSPADM

## 2021-12-28 RX ADMIN — ASPIRIN 81 MG: 81 TABLET, COATED ORAL at 21:19

## 2021-12-28 RX ADMIN — IOPAMIDOL 75 ML: 755 INJECTION, SOLUTION INTRAVENOUS at 13:12

## 2021-12-28 RX ADMIN — LISINOPRIL 5 MG: 5 TABLET ORAL at 21:19

## 2021-12-28 RX ADMIN — ONDANSETRON 4 MG: 2 INJECTION INTRAMUSCULAR; INTRAVENOUS at 18:30

## 2021-12-28 RX ADMIN — RANOLAZINE 500 MG: 500 TABLET, FILM COATED, EXTENDED RELEASE ORAL at 21:19

## 2021-12-28 RX ADMIN — METOPROLOL TARTRATE 25 MG: 25 TABLET, FILM COATED ORAL at 21:19

## 2021-12-28 RX ADMIN — SODIUM CHLORIDE 1000 ML: 9 INJECTION, SOLUTION INTRAVENOUS at 14:10

## 2021-12-28 RX ADMIN — INSULIN LISPRO 1 UNITS: 100 INJECTION, SOLUTION INTRAVENOUS; SUBCUTANEOUS at 21:20

## 2021-12-28 RX ADMIN — Medication 10 ML: at 20:21

## 2021-12-28 RX ADMIN — CLOPIDOGREL BISULFATE 75 MG: 75 TABLET ORAL at 21:19

## 2021-12-28 RX ADMIN — SODIUM CHLORIDE: 9 INJECTION, SOLUTION INTRAVENOUS at 21:17

## 2021-12-28 RX ADMIN — ONDANSETRON 4 MG: 2 INJECTION INTRAMUSCULAR; INTRAVENOUS at 12:39

## 2021-12-28 RX ADMIN — ROSUVASTATIN CALCIUM 40 MG: 40 TABLET, FILM COATED ORAL at 21:19

## 2021-12-28 RX ADMIN — ENOXAPARIN SODIUM 40 MG: 100 INJECTION SUBCUTANEOUS at 20:21

## 2021-12-28 ASSESSMENT — PAIN DESCRIPTION - PAIN TYPE: TYPE: ACUTE PAIN

## 2021-12-28 ASSESSMENT — PATIENT HEALTH QUESTIONNAIRE - PHQ9: SUM OF ALL RESPONSES TO PHQ QUESTIONS 1-9: 10

## 2021-12-28 ASSESSMENT — PAIN SCALES - GENERAL: PAINLEVEL_OUTOF10: 2

## 2021-12-28 ASSESSMENT — PAIN DESCRIPTION - DESCRIPTORS: DESCRIPTORS: ACHING

## 2021-12-28 ASSESSMENT — PAIN DESCRIPTION - LOCATION: LOCATION: ABDOMEN

## 2021-12-28 NOTE — TELEPHONE ENCOUNTER
Ms. Cierra Henry has been ill with vomiting and diarrhea and does not feel the treatment plan her primary care has prescribed is working. She has been unable to keep anything including her medication in as the vomiting persists. She went to a [facility] ED but spent an extended time in the waiting room and ultimately left without being seen. Ms. Cierra Henry reports she is feeling terrible, she is worried she will die and thinks she needs to be admitted and treated as an inpatient. I recommended she return to the ED but she does not want to return to [facility]. She reports [facility] is not too far either but she prefers to come to [facility].  Her son will bring her to [facility] ED.

## 2021-12-28 NOTE — ED NOTES
Pt. to be admitted to [address] [place] [address] room 4110. Report called to Na Sahni RN. Transport to roberto Crawford RN  12/28/21 [id number]

## 2021-12-28 NOTE — ED PROVIDER NOTES
[facility] Emergency Department    CHIEF COMPLAINT  Emesis (Since last Wednesday. Seen by PCP last week for same. Unable to keep medications down. )      SHARED SERVICE VISIT  Evaluated by LOLA. My supervising physician was available for consultation. HISTORY OF PRESENT ILLNESS  Naima Gilman is a 70 y.o. female who presents to the ED complaining of 1 week history of nausea vomiting and diarrhea with associated abdominal discomfort. Patient with history of appendectomy and cholecystectomy. She reports that symptoms have been ongoing for 1 week. Unable to keep anything down. Has become concerned that she is no longer able to keep her medications down. She denies any fevers or chills. No headache, lightheadedness, dizziness confusion. Denies chest pain shortness of breath. No cough or congestion. She reports decreased urination secondary to mild dehydration although reports no pain with urination. No foul-smelling discolored urine. No other complaints, modifying factors or associated symptoms. Nursing notes reviewed.    Past Medical History:   Diagnosis Date    CAD (coronary artery disease)     [facility] occlusion [facility] infarction [facility]     Diabetes mellitus ([facility]     Hyperlipidemia     Hypertension     Kidney calculi     lithitripsy x2 2009 dec    Pneumonia     Vegetarian diet      Past Surgical History:   Procedure Laterality Date    APPENDECTOMY      BACK SURGERY       SECTION      CHOLECYSTECTOMY      CORONARY ANGIOPLASTY WITH STENT PLACEMENT      LITHOTRIPSY      TONSILLECTOMY      UPPER GASTROINTESTINAL ENDOSCOPY       Family History   Problem Relation Age of Onset    Other Mother     Other Father     Diabetes Maternal Grandmother      Social History     Socioeconomic History    Marital status:      Spouse name: Not on file    Number of children: Not on file    Years of education: Not on file    Highest education level: Not on file   Occupational History    Not on file   Tobacco Use    Smoking status: Never Smoker    Smokeless tobacco: Never Used   Vaping Use    Vaping Use: Never used   Substance and Sexual Activity    Alcohol use: No    Drug use: Never    Sexual activity: Not Currently     Partners: Male   Other Topics Concern    Not on file   Social History Narrative    Not on file     Social Determinants of Health     Financial Resource Strain:     Difficulty of Paying Living Expenses: Not on file   Food Insecurity:     Worried About Running Out of Food in the Last Year: Not on file    Jackie of Food in the Last Year: Not on file   Transportation Needs:     Lack of Transportation (Medical): Not on file    Lack of Transportation (Non-Medical):  Not on file   Physical Activity:     Days of Exercise per Week: Not on file    Minutes of Exercise per Session: Not on file   Stress:     Feeling of Stress : Not on file   Social Connections:     Frequency of Communication with Friends and Family: Not on file    Frequency of Social Gatherings with Friends and Family: Not on file    Attends Sabianism Services: Not on file    Active Member of [address] [place] [address] or Organizations: Not on file    Attends Club or Organization Meetings: Not on file    Marital Status: Not on file   Intimate Partner Violence:     Fear of Current or Ex-Partner: Not on file    Emotionally Abused: Not on file    Physically Abused: Not on file    Sexually Abused: Not on file   Housing Stability:     Unable to Pay for Housing in the Last Year: Not on file    Number of [place] in the Last Year: Not on file    Unstable Housing in the Last Year: Not on file     Current Facility-Administered Medications   Medication Dose Route Frequency Provider Last Rate Last Admin    ondansetron (ZOFRAN) injection 4 mg  4 mg IntraVENous Once Tessa Mosqueda         Current Outpatient Medications   Medication Sig Dispense Refill    fenofibrate (TRIGLIDE) 160 MG tablet TAKE 1 TABLET BY MOUTH EVERY DAY 90 tablet 3    rosuvastatin (CRESTOR) 40 MG tablet TAKE 1 TABLET BY MOUTH EVERY DAY 90 tablet 2    rosuvastatin (CRESTOR) 40 MG tablet TAKE 1 TABLET BY MOUTH EVERY DAY 30 tablet 2    clobetasol (TEMOVATE) 0.05 % ointment Apply thin application to affected area at night for four weeks, then every other night for four weeks, then twice weekly for four weeks 45 g 1    metFORMIN (GLUCOPHAGE) 500 MG tablet TAKE 1 TABLET BY MOUTH 2 (TWO) TIMES DAILY WITH MEALS. FOR DIABETES      saccharomyces boulardii (FLORASTOR) 250 MG capsule Take 250 mg by mouth 2 times daily      nystatin-triamcinolone (MYCOLOG) 698569-5.1 UNIT/GM-% ointment Apply topically 2 times daily. 60 g 1    clopidogrel (PLAVIX) 75 MG tablet TAKE 1 TABLET BY MOUTH EVERY DAY 90 tablet 3    lisinopril (PRINIVIL;ZESTRIL) 5 MG tablet TAKE 1 TABLET BY MOUTH EVERY DAY 90 tablet 3    ranolazine (RANEXA) 500 MG extended release tablet TAKE 1 TABLET BY MOUTH TWICE A  tablet 3    VASCEPA 1 g CAPS capsule TAKE 2 CAPSULES BY MOUTH TWICE A  capsule 6    nitroGLYCERIN (NITROSTAT) 0.4 MG SL tablet PLACE 1 TABLET UNDER THE TONGUE EVERY 5 MINUTES AS NEEDED FOR CHEST PAIN 25 tablet 6    glipiZIDE (GLUCOTROL) 5 MG tablet Take 5 mg by mouth every morning (before breakfast)      dapagliflozin (FARXIGA) 5 MG tablet Take 5 mg by mouth every morning      Blood Glucose Monitoring Suppl (TRUE METRIX METER) w/Device KIT 1 kit by Does not apply route daily 1 kit 0    blood glucose test strips (TRUE METRIX BLOOD GLUCOSE TEST) strip 1 each by In Vitro route daily As needed. 100 each 3    niacin 500 MG tablet Take 500 mg by mouth 3 times daily      Handicap Placard MISC by Does not apply route Handicap to  in 5 years for patient's history of CAD.  Patient unable to walk more than 200 feet without stopping to rest. 1 each 0    metoprolol (LOPRESSOR) 25 MG tablet Take 1 tablet by mouth 2 times daily. 180 tablet 3    Multiple Vitamins-Minerals (CENTRUM SILVER PO) Take  by mouth daily.  aspirin EC 81 MG EC tablet Take 1 tablet by mouth daily. 30 tablet 3    omeprazole (PRILOSEC) 20 MG capsule Take 20 mg by mouth as needed.  promethazine (PHENERGAN) 25 MG tablet Take 25 mg by mouth every 6 hours as needed for Nausea.  meclizine (ANTIVERT) 25 MG tablet Take 25 mg by mouth 3 times daily as needed.  Omega-3 Fatty Acids (FISH OIL BURP-LESS) 1200 MG CAPS Take 2,400 mg by mouth.  cetirizine (ZYRTEC) 10 MG tablet Take 10 mg by mouth as needed.        Facility-Administered Medications Ordered in Other Encounters   Medication Dose Route Frequency Provider Last Rate Last Admin    aminocaproic acid (AMICAR) 5 g in sodium chloride 0.9 % 250 mL IV bolus  5 g IntraVENous Once Ramirez Churchill MD        insulin regular (HUMULIN R;NOVOLIN R) 100 Units in sodium chloride 0.9 % 100 mL infusion  0.1 Units/kg/hr IntraVENous Once Ramirez Churchill MD        phenylephrine (COLIN-SYNEPHRINE) 50 mg in dextrose 5 % 250 mL infusion  100 mcg/min IntraVENous Once Ramirez Churchill MD        lactated ringers infusion   IntraVENous Once Ramirez Churchill MD        pantoprazole (PROTONIX) injection 40 mg  40 mg IntraVENous Once Surya Mead MD         Allergies   Allergen Reactions    Effient [Prasugrel] Swelling     Swelling in tongue, itching    Ranolazine Hives, Itching and Swelling     Can only take name brand Ranexa  Can not take generic    Azithromycin      Thrush on tongue    Dilaudid [Hydromorphone Hcl]      Severe head pain and ear pressure, very weak    Dolobid [Diflunisal] Nausea Only    Doxycycline      n  & v      Ranolazine Er Hives, Itching and Swelling     Can only take name brand Ranexa  Can not take generic    Vibramycin [Doxycycline Calcium]      Severe nausea and vomiting    Morphine Sulfate Nausea And Vomiting       REVIEW OF SYSTEMS  10 systems reviewed, pertinent positives per HPI otherwise noted to be negative    PHYSICAL EXAM  BP (!) 143/77   Pulse 100   Temp 98.7 °F (37.1 °C) (Oral)   Resp 16   Ht 5' 3\" (1.6 m)   Wt 160 lb 4.4 oz (72.7 kg)   SpO2 98%   BMI 28.39 kg/m²   GENERAL APPEARANCE: Awake and alert. Cooperative. No acute distress. HEAD: Normocephalic. Atraumatic. EYES: PERRL. EOM's grossly intact. ENT: Mucous membranes are moist.   NECK: Supple. HEART: RRR. No murmurs. LUNGS: Respirations unlabored. CTAB. Good air exchange. Speaking comfortably in full sentences. ABDOMEN: Soft. Non-distended. Generalized nonfocal abdominal tenderness without rigidity, guarding or rebound. Negative Swift's, McBurney's and Rovsing's. No fluids or ascites. Hernias or masses. Bowel sounds normal in all quadrants. No CVA tenderness. EXTREMITIES: No peripheral edema. Moves all extremities equally. All extremities neurovascularly intact. SKIN: Warm and dry. No acute rashes. NEUROLOGICAL: Alert and oriented. CN's 2-12 intact. No gross facial drooping. Strength 5/5, sensation intact. PSYCHIATRIC: Normal mood and affect. RADIOLOGY  CT ABDOMEN PELVIS W IV CONTRAST Additional Contrast? None    Result Date: 12/28/2021  EXAMINATION: CT OF THE ABDOMEN AND PELVIS WITH CONTRAST 12/28/2021 1:06 pm TECHNIQUE: CT of the abdomen and pelvis was performed with the administration of intravenous contrast. Multiplanar reformatted images are provided for review. Dose modulation, iterative reconstruction, and/or weight based adjustment of the mA/kV was utilized to reduce the radiation dose to as low as reasonably achievable. COMPARISON: None.  HISTORY: ORDERING SYSTEM PROVIDED HISTORY: abd pain, n/v/d TECHNOLOGIST PROVIDED HISTORY: Reason for exam:->abd pain, n/v/d Additional Contrast?->None Decision Support Exception - unselect if not a suspected or confirmed emergency medical condition->Emergency Medical Condition (MA) FINDINGS: Lower Chest: Few nodular consolidations as well as a few scattered ground-glass opacities in the visualized lungs. Organs: Left hepatic lobe cyst.  The liver is otherwise unremarkable without evidence of intrahepatic biliary ductal dilation. Status post cholecystectomy. The spleen, pancreas and adrenal glands are unremarkable. . A large left renal cyst.  The kidneys are otherwise unremarkable without evidence for hydronephrosis. GI/Bowel: There is no evidence of bowel obstruction. No evidence of abnormal bowel wall thickening or distension. The appendix is not visualized. Pelvis:  Bladder is unremarkable in appearance. The uterus and adnexa are without acute abnormality. Peritoneum/Retroperitoneum: No evidence of ascites or free air. No evidence of lymphadenopathy. Atherosclerotic disease of the abdominal aorta. Bones/Soft Tissues:  No acute abnormality of the visualized osseous structures. Visualized soft tissues are unremarkable. No acute pathology in the abdomen and pelvis. RECOMMENDATIONS: Unavailable     XR CHEST PORTABLE    Result Date: 12/28/2021  EXAMINATION: ONE XRAY VIEW OF THE CHEST 12/28/2021 11:34 am COMPARISON: 10/10/2013. HISTORY: ORDERING SYSTEM PROVIDED HISTORY: abd pain TECHNOLOGIST PROVIDED HISTORY: Reason for exam:->abd pain Reason for Exam: abd pain FINDINGS: The cardiomediastinal silhouette is stable status post median sternotomy. The lungs are clear. No infiltrate, pleural fluid or evidence of overt failure. Stable linear scarring in the left lower lung field. No acute cardiopulmonary disease. ED COURSE  Patient received zofran IV for nausea, with good relief. Triage vitals stable. CBC without leukocytosis or anemia. CMP unremarkable as well. No evidence for significant dehydration. Patient with a lipase of approximately 260. Urinalysis without evidence for infectious process. Troponin less than 0.01. CT abdomen and pelvis without acute pathologic process. Stable portable chest x-ray.  EKG was normal sinus rhythm without evidence for acute ischemic change. Findings at this time appear consistent with acute pancreatitis. Giving ongoing nausea and abdominal discomfort we are at this time recommending admission. Discussed case with hospital medicine regarding admission orders placed. A discussion was had with Ms. Oneyda Roberson regarding abdominal pain, nausea and vomiting, ED findings and recommendations for admission. Risk management discussed and shared decision making had with patient and/or surrogate. All questions were answered. Patient in agreement.     MDM  Results for orders placed or performed during the hospital encounter of 12/28/21   CBC Auto Differential   Result Value Ref Range    WBC 4.3 4.0 - 11.0 K/uL    RBC 5.28 (H) 4.00 - 5.20 M/uL    Hemoglobin 14.4 12.0 - 16.0 g/dL    Hematocrit 44.5 36.0 - 48.0 %    MCV 84.2 80.0 - 100.0 fL    MCH 27.2 26.0 - 34.0 pg    MCHC 32.3 31.0 - 36.0 g/dL    RDW 14.0 12.4 - 15.4 %    Platelets 646 222 - 768 K/uL    MPV 8.8 5.0 - 10.5 fL    Neutrophils % 53.6 %    Lymphocytes % 27.5 %    Monocytes % 16.6 %    Eosinophils % 1.4 %    Basophils % 0.9 %    Neutrophils Absolute 2.3 1.7 - 7.7 K/uL    Lymphocytes Absolute 1.2 1.0 - 5.1 K/uL    Monocytes Absolute 0.7 0.0 - 1.3 K/uL    Eosinophils Absolute 0.1 0.0 - 0.6 K/uL    Basophils Absolute 0.0 0.0 - 0.2 K/uL   Comprehensive Metabolic Panel w/ Reflex to MG   Result Value Ref Range    Sodium 133 (L) 136 - 145 mmol/L    Potassium reflex Magnesium 4.1 3.5 - 5.1 mmol/L    Chloride 98 (L) 99 - 110 mmol/L    CO2 22 21 - 32 mmol/L    Anion Gap 13 3 - 16    Glucose 235 (H) 70 - 99 mg/dL    BUN 39 (H) 7 - 20 mg/dL    CREATININE 1.1 0.6 - 1.2 mg/dL    GFR Non- 49 (A) >60    GFR  59 (A) >60    Calcium 9.5 8.3 - 10.6 mg/dL    Total Protein 8.0 6.4 - 8.2 g/dL    Albumin 4.3 3.4 - 5.0 g/dL    Albumin/Globulin Ratio 1.2 1.1 - 2.2    Total Bilirubin 0.4 0.0 - 1.0 mg/dL    Alkaline Phosphatase 63 40 - 129 U/L    ALT 25 10 - 40 U/L AST 18 15 - 37 U/L   Lipase   Result Value Ref Range    Lipase 258.0 (H) 13.0 - 60.0 U/L   Urinalysis Reflex to Culture    Specimen: Urine, clean catch   Result Value Ref Range    Color, UA DK YELLOW Straw/Yellow    Clarity, UA CLOUDY (A) Clear    Glucose, Ur >=1000 (A) Negative mg/dL    Bilirubin Urine SMALL (A) Negative    Ketones, Urine Negative Negative mg/dL    Specific Gravity, UA 1.028 1.005 - 1.030    Blood, Urine Negative Negative    pH, UA 5.5 5.0 - 8.0    Protein, UA TRACE (A) Negative mg/dL    Urobilinogen, Urine 0.2 <2.0 E.U./dL    Nitrite, Urine Negative Negative    Leukocyte Esterase, Urine Negative Negative    Microscopic Examination YES     Urine Type NotGiven     Urine Reflex to Culture Not Indicated    Troponin   Result Value Ref Range    Troponin <0.01 <0.01 ng/mL   Microscopic Urinalysis   Result Value Ref Range    Hyaline Casts, UA 11-20 (A) 0 - 2 /LPF    Crystals, UA 1+ Uric Acid (A) None Seen /HPF    Urinalysis Comments see below     WBC, UA 2 0 - 5 /HPF    RBC, UA 6 (H) 0 - 4 /HPF    Epithelial Cells, UA 5 0 - 5 /HPF   EKG 12 Lead   Result Value Ref Range    Ventricular Rate 98 BPM    Atrial Rate 98 BPM    P-R Interval 130 ms    QRS Duration 86 ms    Q-T Interval 364 ms    QTc Calculation (Bazett) 464 ms    P Axis 80 degrees    R Axis 54 degrees    T Axis 47 degrees    Diagnosis       Normal sinus rhythmConfirmed by Cindy MUÑIZ MD ([id number]) on 12/28/2021 1:36:50 PM     I spoke with Dr. Jeet Mendieta. We thoroughly discussed the history, physical exam, laboratory and imaging studies, as well as, emergency department course. Based upon that discussion, we've decided to admit Pablo Dailey to the hospital for further observation, evaluation and treatment. Final Impression  1. Acute pancreatitis, unspecified complication status, unspecified pancreatitis type      Blood pressure (!) 137/57, pulse 88, temperature 98.7 °F (37.1 °C), temperature source Oral, resp.  rate 16, height 5' 3\" (1.6 m), weight 160 lb 4.4 oz (72.7 kg), SpO2 95 %. DISPOSITION  Patient was admitted to the hospital in stable condition.          [place], [place]  12/28/21 1977

## 2021-12-28 NOTE — PROGRESS NOTES
Medication Reconciliation    List of medications patient is currently taking is complete. Source of information: 1. Conversation with patient at bedside                                      2. EPIC records      Allergies  Effient [prasugrel], Ranolazine, Azithromycin, Dilaudid [hydromorphone hcl], Dolobid [diflunisal], Doxycycline, Ranolazine er, Vibramycin [doxycycline calcium], and Morphine sulfate     Notes regarding home medications:   1. Patient did not receive any of her home medications prior to arrival to the emergency department today.     Tiffany Maza RPH, PharmD, BCPS  12/28/2021 2:40 PM

## 2021-12-29 LAB
ANION GAP SERPL CALCULATED.3IONS-SCNC: 10 MMOL/L (ref 3–16)
BUN BLDV-MCNC: 29 MG/DL (ref 7–20)
CALCIUM SERPL-MCNC: 8.3 MG/DL (ref 8.3–10.6)
CHLORIDE BLD-SCNC: 104 MMOL/L (ref 99–110)
CO2: 21 MMOL/L (ref 21–32)
CREAT SERPL-MCNC: 0.8 MG/DL (ref 0.6–1.2)
ESTIMATED AVERAGE GLUCOSE: 154.2 MG/DL
GFR AFRICAN AMERICAN: >60
GFR NON-AFRICAN AMERICAN: >60
GLUCOSE BLD-MCNC: 131 MG/DL (ref 70–99)
GLUCOSE BLD-MCNC: 153 MG/DL (ref 70–99)
GLUCOSE BLD-MCNC: 159 MG/DL (ref 70–99)
GLUCOSE BLD-MCNC: 175 MG/DL (ref 70–99)
GLUCOSE BLD-MCNC: 180 MG/DL (ref 70–99)
HBA1C MFR BLD: 7 %
HCT VFR BLD CALC: 39.1 % (ref 36–48)
HEMOGLOBIN: 12.8 G/DL (ref 12–16)
MCH RBC QN AUTO: 27.8 PG (ref 26–34)
MCHC RBC AUTO-ENTMCNC: 32.9 G/DL (ref 31–36)
MCV RBC AUTO: 84.6 FL (ref 80–100)
PDW BLD-RTO: 14.1 % (ref 12.4–15.4)
PERFORMED ON: ABNORMAL
PLATELET # BLD: 228 K/UL (ref 135–450)
PMV BLD AUTO: 8 FL (ref 5–10.5)
POTASSIUM REFLEX MAGNESIUM: 3.7 MMOL/L (ref 3.5–5.1)
RBC # BLD: 4.62 M/UL (ref 4–5.2)
SODIUM BLD-SCNC: 135 MMOL/L (ref 136–145)
WBC # BLD: 4.9 K/UL (ref 4–11)

## 2021-12-29 PROCEDURE — 6370000000 HC RX 637 (ALT 250 FOR IP): Performed by: INTERNAL MEDICINE

## 2021-12-29 PROCEDURE — 83036 HEMOGLOBIN GLYCOSYLATED A1C: CPT

## 2021-12-29 PROCEDURE — 6360000002 HC RX W HCPCS: Performed by: INTERNAL MEDICINE

## 2021-12-29 PROCEDURE — 2580000003 HC RX 258: Performed by: INTERNAL MEDICINE

## 2021-12-29 PROCEDURE — 80048 BASIC METABOLIC PNL TOTAL CA: CPT

## 2021-12-29 PROCEDURE — 94760 N-INVAS EAR/PLS OXIMETRY 1: CPT

## 2021-12-29 PROCEDURE — 36415 COLL VENOUS BLD VENIPUNCTURE: CPT

## 2021-12-29 PROCEDURE — 1200000000 HC SEMI PRIVATE

## 2021-12-29 PROCEDURE — 85027 COMPLETE CBC AUTOMATED: CPT

## 2021-12-29 RX ORDER — SODIUM CHLORIDE, SODIUM LACTATE, POTASSIUM CHLORIDE, CALCIUM CHLORIDE 600; 310; 30; 20 MG/100ML; MG/100ML; MG/100ML; MG/100ML
INJECTION, SOLUTION INTRAVENOUS CONTINUOUS
Status: DISCONTINUED | OUTPATIENT
Start: 2021-12-29 | End: 2021-12-31 | Stop reason: HOSPADM

## 2021-12-29 RX ADMIN — RANOLAZINE 500 MG: 500 TABLET, FILM COATED, EXTENDED RELEASE ORAL at 09:19

## 2021-12-29 RX ADMIN — METOPROLOL TARTRATE 25 MG: 25 TABLET, FILM COATED ORAL at 22:05

## 2021-12-29 RX ADMIN — ASPIRIN 81 MG: 81 TABLET, COATED ORAL at 09:19

## 2021-12-29 RX ADMIN — SODIUM CHLORIDE: 9 INJECTION, SOLUTION INTRAVENOUS at 11:00

## 2021-12-29 RX ADMIN — PANTOPRAZOLE SODIUM 40 MG: 40 TABLET, DELAYED RELEASE ORAL at 06:15

## 2021-12-29 RX ADMIN — METOPROLOL TARTRATE 25 MG: 25 TABLET, FILM COATED ORAL at 09:28

## 2021-12-29 RX ADMIN — INSULIN LISPRO 2 UNITS: 100 INJECTION, SOLUTION INTRAVENOUS; SUBCUTANEOUS at 13:00

## 2021-12-29 RX ADMIN — CLOPIDOGREL BISULFATE 75 MG: 75 TABLET ORAL at 09:19

## 2021-12-29 RX ADMIN — LISINOPRIL 5 MG: 5 TABLET ORAL at 22:05

## 2021-12-29 RX ADMIN — RANOLAZINE 500 MG: 500 TABLET, FILM COATED, EXTENDED RELEASE ORAL at 22:05

## 2021-12-29 RX ADMIN — ENOXAPARIN SODIUM 40 MG: 100 INJECTION SUBCUTANEOUS at 22:05

## 2021-12-29 RX ADMIN — ROSUVASTATIN CALCIUM 40 MG: 40 TABLET, FILM COATED ORAL at 09:18

## 2021-12-29 RX ADMIN — INSULIN LISPRO 2 UNITS: 100 INJECTION, SOLUTION INTRAVENOUS; SUBCUTANEOUS at 17:44

## 2021-12-29 RX ADMIN — SODIUM CHLORIDE, POTASSIUM CHLORIDE, SODIUM LACTATE AND CALCIUM CHLORIDE: 600; 310; 30; 20 INJECTION, SOLUTION INTRAVENOUS at 15:15

## 2021-12-29 RX ADMIN — ONDANSETRON 4 MG: 2 INJECTION INTRAMUSCULAR; INTRAVENOUS at 11:01

## 2021-12-29 RX ADMIN — INSULIN LISPRO 2 UNITS: 100 INJECTION, SOLUTION INTRAVENOUS; SUBCUTANEOUS at 09:33

## 2021-12-29 RX ADMIN — Medication 1 CAPSULE: at 09:19

## 2021-12-29 ASSESSMENT — PAIN SCALES - GENERAL: PAINLEVEL_OUTOF10: 0

## 2021-12-29 NOTE — PROGRESS NOTES
Occupational Therapy  No Eval/Discharge    Eric Pires  12/29/2021    After chart review and speaking to the pt, the pt confirms that she is up on her own in the room completing ADLs independently and does not feel she has a need for skilled OT at this time. The pt has no concerns re: going home and reports she has a  who can assist as needed. This confirmed per RN, Cirilo Whelan. Will d/c from acute care OT services due to no needs.     Roshan Morin, OTR/L [id number]

## 2021-12-29 NOTE — CARE COORDINATION
INITIAL CASE MANAGEMENT ASSESSMENT     Reviewed chart, met with patient to assess possible discharge needs. Explained Case Management role/services. SW met with patient alone at bedside today.      Living Situation: Patient resides in a town home with her  and one dog (a Giles). There is entry level admission. Prior to medical admission patient reports that she had no trouble getting in and out of the property.      ADLs: Prior to medical admission patient reports that she was independent. She stated that she doesn't anticipate any needs at discharge.      DME: Prior to medical admission patient reports that she had a built in shower sheet. She stated that she doesn't anticipate any needs at discharge.       PT/OT Recs: Home independently per PT/OT.      Active Services: Prior to medical admission patient reports that she had no active services in place. She stated that she doesn't anticipate any needs at discharge. .      Transportation: Prior to medical admission patient reports that she was an active . She stated that her son Lucio Tobar will likely transport her home at discharge.      Medications: Patient receives medications from [facility] in [place] [address] this time there are no issues with access or affordability.      PCP: Sanjay Warner -[phone] (HOTXX) FOW [phone] (fax number). Patient reports that her last appointment with this provider was over one week ago.  [facility]  HD/PD: N/A     PLAN/COMMENTS:   1) GI clearance. Monitoring for advanced diet toleration and pain management. 2) Discharge to home with family.     SW provided contact information for patient or family to call with any questions. SW will follow and assist as needed.     Respectfully submitted,     MARCUS Bean  [facility]   [phone]     Electronically signed by MARCUS Gallego on 12/29/2021 at 2:27 PM

## 2021-12-29 NOTE — PROGRESS NOTES
[facility] GI    Patient admitted last night  I saw her once in 2010  I was just notified of the consultation request this afternoon  I will see her tomorrow AM

## 2021-12-29 NOTE — ACP (ADVANCE CARE PLANNING)
Advance Care Planning     Advance Care Planning Activator (Inpatient)  Conversation Note      Date of ACP Conversation: 12/29/2021     Conversation Conducted with: Patient with Decision Making Capacity    ACP Activator: MARCUS Sutherland    Health Care Decision Maker:     Current Designated Health Care Decision Maker:     Primary Decision Maker: Kati Najera - Child - [phone]    Secondary Decision Maker: Sofia Casper - Child - [phone]    Care Preferences    Ventilation: \"If you were in your present state of health and suddenly became very ill and were unable to breathe on your own, what would your preference be about the use of a ventilator (breathing machine) if it were available to you? \"      Would the patient desire the use of ventilator (breathing machine)?: Unsure    \"If your health worsens and it becomes clear that your chance of recovery is unlikely, what would your preference be about the use of a ventilator (breathing machine) if it were available to you? \"     Would the patient desire the use of ventilator (breathing machine)?: Unsure      Resuscitation  \"CPR works best to restart the heart when there is a sudden event, like a heart attack, in someone who is otherwise healthy. Unfortunately, CPR does not typically restart the heart for people who have serious health conditions or who are very sick. \"    \"In the event your heart stopped as a result of an underlying serious health condition, would you want attempts to be made to restart your heart (answer \"yes\" for attempt to resuscitate) or would you prefer a natural death (answer \"no\" for do not attempt to resuscitate)? \" Unsure       [] Yes   [] No   Educated Patient / [employer] regarding differences between Advance Directives and portable DNR orders.     Length of ACP Conversation in minutes:   3    Conversation Outcomes:  [x] ACP discussion completed  [] Existing advance directive reviewed with patient; no changes to patient's previously recorded wishes  [] New Advance Directive completed  [] Portable Do Not Rescitate prepared for Provider review and signature  [] POLST/POST/MOLST/MOST prepared for Provider review and signature      Follow-up plan:    [] Schedule follow-up conversation to continue planning  [] Referred individual to Provider for additional questions/concerns   [] Advised patient/agent/surrogate to review completed ACP document and update if needed with changes in condition, patient preferences or care setting    [x] This note routed to one or more involved healthcare providers Aimee Potts MD primary care physician. Patient indicates that she has a HCPOA but we do NOT have a copy of it at this time. Decision makers are listed above. Respectfully submitted,    MARCUS Angel  [facility]   [phone]    Electronically signed by MARCUS Hurt on 12/29/2021 at 2:31 PM

## 2021-12-29 NOTE — PROGRESS NOTES
Physical Therapy  No eval/Discharge  Dorinda Hernandez  [id number]    After chart review and speaking to the pt, the pt confirms that she is up on her own in the room and does not feel she has a need for skilled PT at this time. The pt has no concerns re: going home and reports she has a  who can assist as needed. This confirmed per RN, Aidan Nicole. Will d/c from acute care PT services due to no needs.   Electronically signed by Catherine Pang, PT [id number] on 12/29/2021 at 9:48 AM

## 2021-12-29 NOTE — FLOWSHEET NOTE
4 Eyes Skin Assessment     NAME:  Fina Locke  YOB: 1950  MEDICAL RECORD NUMBER:  [id number]    The patient is being assess for  Admission    I agree that 2 RN's have performed a thorough Head to Toe Skin Assessment on the patient. ALL assessment sites listed below have been assessed. Areas assessed by both nurses:    Head, Face, Ears, Shoulders, Back, Chest, Arms, Elbows, Hands, Sacrum. Buttock, Coccyx, Ischium and Legs. Feet and Heels        Does the Patient have a Wound?  No noted wound(s)       Yasmani Prevention initiated:  No   Wound Care Orders initiated:  No    Pressure Injury (Stage 3,4, Unstageable, DTI, NWPT, and Complex wounds) if present place consult order under [de-identified] No    New and Established Ostomies if present place consult order under : No      Nurse 1 eSignature: Electronically signed by Autumn Jensen RN on 12/29/2021 at 2:07 AM        Nurse 2 eSignature: Electronically signed by Loreatha Castleman, RN on 12/29/21 at 8:42 AM EST

## 2021-12-29 NOTE — H&P
Hospital Medicine History & Physical      PCP: Omkar Giang MD    Date of Admission: 2021    Chief Complaint:  N/V    History Of Present Illness:  Patient is a 70-year-old female with past medical history of CAD diabetes mellitus hypertension hyperlipidemia who presents to the hospital for nausea vomiting. According to patient she has been having vomiting for past 1 week, it has been getting worse, she has difficulty holding down food and medications so she decided to come to the hospital.  Denies using alcohol, previous history of pancreatitis. Patient had cholecystectomy performed but mentions she did not have gallstone issues. Denied fevers chills diarrhea constipation dysuria      Past Medical History:          Diagnosis Date    CAD (coronary artery disease)     [facility]     Diabetes mellitus ([facility] [facility]     Hyperlipidemia     Hypertension     Kidney calculi     lithitripsy x2 2009 dec    Pneumonia     Vegetarian diet        Past Surgical History:          Procedure Laterality Date    APPENDECTOMY      BACK SURGERY       SECTION      CHOLECYSTECTOMY      CORONARY ANGIOPLASTY WITH STENT PLACEMENT      LITHOTRIPSY      TONSILLECTOMY      UPPER GASTROINTESTINAL ENDOSCOPY         Medications Prior to Admission:      Prior to Admission medications    Medication Sig Start Date End Date Taking? Authorizing Provider   fenofibrate (TRIGLIDE) 160 MG tablet TAKE 1 TABLET BY MOUTH EVERY DAY 12/10/21  Yes Radha Mendez MD   rosuvastatin (CRESTOR) 40 MG tablet TAKE 1 TABLET BY MOUTH EVERY DAY 21  Yes Farrah Banks MD   clobetasol (TEMOVATE) 0.05 % ointment Apply thin application to affected area at night for four weeks, then every other night for four weeks, then twice weekly for four weeks 10/14/21  Yes ANITA Patel - CNP   metFORMIN (GLUCOPHAGE) 500 MG tablet TAKE 1 TABLET BY MOUTH 2 (TWO) TIMES DAILY WITH MEALS.  FOR DIABETES 9/11/21  Yes Historical Provider, MD   saccharomyces boulardii (FLORASTOR) 250 MG capsule Take 250 mg by mouth 2 times daily   Yes Historical Provider, MD   clopidogrel (PLAVIX) 75 MG tablet TAKE 1 TABLET BY MOUTH EVERY DAY 4/20/21  Yes Lisa Simon MD   lisinopril (PRINIVIL;ZESTRIL) 5 MG tablet TAKE 1 TABLET BY MOUTH EVERY DAY 2/12/21  Yes Lisa Simon MD   ranolazine (RANEXA) 500 MG extended release tablet TAKE 1 TABLET BY MOUTH TWICE A DAY 1/15/21  Yes Lisa Simon MD   VASCEPA 1 g CAPS capsule TAKE 2 CAPSULES BY MOUTH TWICE A DAY 10/19/20  Yes Lisa Simon MD   nitroGLYCERIN (NITROSTAT) 0.4 MG SL tablet PLACE 1 TABLET UNDER THE TONGUE EVERY 5 MINUTES AS NEEDED FOR CHEST PAIN 3/2/20  Yes Matthew Huitron MD   glipiZIDE (GLUCOTROL) 5 MG tablet Take 5 mg by mouth 2 times daily (before meals)    Yes Historical Provider, MD   dapagliflozin (FARXIGA) 5 MG tablet Take 5 mg by mouth every morning   Yes Historical Provider, MD   niacin 500 MG tablet Take 500 mg by mouth 3 times daily   Yes Historical Provider, MD   metoprolol (LOPRESSOR) 25 MG tablet Take 1 tablet by mouth 2 times daily. 11/26/14  Yes Lisa Simon MD   Multiple Vitamins-Minerals (CENTRUM SILVER PO) Take 1 tablet by mouth daily    Yes Historical Provider, MD   aspirin EC 81 MG EC tablet Take 1 tablet by mouth daily. 3/28/14  Yes Lisa Simon MD   omeprazole (PRILOSEC) 20 MG capsule Take 20 mg by mouth Daily    Yes Historical Provider, MD   promethazine (PHENERGAN) 25 MG tablet Take 25 mg by mouth every 6 hours as needed for Nausea. Yes Historical Provider, MD   meclizine (ANTIVERT) 25 MG tablet Take 25 mg by mouth 3 times daily as needed.    Yes Historical Provider, MD   Omega-3 Fatty Acids (FISH OIL BURP-LESS) 1200 MG CAPS Take 2,400 mg by mouth daily    Yes Historical Provider, MD   cetirizine (ZYRTEC) 10 MG tablet Take 10 mg by mouth daily as needed for Allergies or Rhinitis  12/31/10  Yes Historical Provider, MD Allergies:  Effient [prasugrel], Ranolazine, Azithromycin, Dilaudid [hydromorphone hcl], Dolobid [diflunisal], Doxycycline, Ranolazine er, Vibramycin [doxycycline calcium], and Morphine sulfate    Social History:      TOBACCO:   reports that she has never smoked. She has never used smokeless tobacco.  ETOH:   reports no history of alcohol use. Family History:       Reviewed in detail and non contributory          Problem Relation Age of Onset    Other Mother     Other Father     Diabetes Maternal Grandmother        REVIEW OF SYSTEMS:   Pertinent positives as noted in the HPI. All other systems reviewed and negative. PHYSICAL EXAM PERFORMED:    /64   Pulse 96   Temp 99.2 °F (37.3 °C) (Oral)   Resp 18   Ht 5' 3\" (1.6 m)   Wt 160 lb 4.4 oz (72.7 kg)   SpO2 96%   BMI 28.39 kg/m²     General appearance:  No apparent distress, cooperative. HEENT:  Normal cephalic, atraumatic without obvious deformity. Conjunctivae/corneas clear. Neck: Supple, with full range of motion. No cervical lymphadenopathy  Respiratory:  Normal respiratory effort. Clear to auscultation, bilaterally without Rales/Wheezes/Rhonchi. Cardiovascular:  Regular rate and rhythm with normal S1/S2 without murmurs, rubs or gallops. Abdomen: Soft, non-tender, non-distended, normal bowel sounds. Musculoskeletal:  No edema noted bilaterally. No tenderness on palpation   Skin: no rash visible  Neurologic:  Neurologically intact without any focal sensory/motor deficits. grossly non-focal.  Psychiatric:  Alert and oriented, normal mood  Peripheral Pulses: +2 palpable, equal bilaterally       Labs:     Recent Labs     12/28/21  1157   WBC 4.3   HGB 14.4   HCT 44.5        Recent Labs     12/28/21  1157   *   K 4.1   CL 98*   CO2 22   BUN 39*   CREATININE 1.1   CALCIUM 9.5     Recent Labs     12/28/21  1157   AST 18   ALT 25   BILITOT 0.4   ALKPHOS 63     No results for input(s): INR in the last 72 hours.   Recent Labs 12/28/21  1157   TROPONINI <0.01       Urinalysis:      Lab Results   Component Value Date    NITRU Negative 12/28/2021    WBCUA 2 12/28/2021    RBCUA 6 12/28/2021    BLOODU Negative 12/28/2021    SPECGRAV 1.028 12/28/2021    GLUCOSEU >=1000 12/28/2021    GLUCOSEU NEGATIVE 11/18/2011       Radiology:       CT ABDOMEN PELVIS W IV CONTRAST Additional Contrast? None   Final Result   No acute pathology in the abdomen and pelvis. RECOMMENDATIONS:   Unavailable         XR CHEST PORTABLE   Final Result   No acute cardiopulmonary disease. Active Hospital Problems    Diagnosis Date Noted    Hx of acute pancreatitis [Z87.19] 12/28/2021       Patient is a 79-year-old female with past medical history of CAD diabetes mellitus hypertension hyperlipidemia who presents to the hospital for nausea vomiting. According to patient she has been having vomiting for past 1 week, it has been getting worse, she has difficulty holding down food and medications so she decided to come to the hospital.  Denies using alcohol, previous history of pancreatitis. Patient had cholecystectomy performed but mentions she did not have gallstone issues. Denied fevers chills diarrhea constipation dysuria    Assessment  Nausea vomiting likely secondary to acute pancreatitis  Diabetes mellitus  CAD  Hypertension  Hyperlipidemia  Hyponatremia      Plan    Start IV fluid therapy, patient mentions nausea vomiting has improved, will start on clear liquid diet, pain control  Insulin sliding scale  Resume home medication  DVT prophylaxis Lovenox  Diet: ADULT DIET; Clear Liquid; 4 carb choices (60 gm/meal)  Code Status: Full Code    PT/OT Eval Status: ordered    Dispo - pending clinical improvement       Demond Watkins MD    The note was completed using EMR and Dragon dictation system. Every effort was made to ensure accuracy; however, inadvertent computerized transcription errors may be present.     Thank you Lili Mcallister MD for the opportunity to be involved in this patient's care. If you have any questions or concerns please feel free to contact me at [phone].     Jes Garcia MD

## 2021-12-29 NOTE — PLAN OF CARE
Problem: Falls - Risk of:  Goal: Will remain free from falls  Description: Will remain free from falls  Outcome: Ongoing     Problem: Falls - Risk of:  Goal: Absence of physical injury  Description: Absence of physical injury  Outcome: Ongoing   Patient uses call light appropriately to express needs. Bed to lowest position with door open and call light in reach. All fall precautions implemented at this time. Bed alarm on for safety. Siderails up x2. Non skid footwear in place. Patient has had no falls this shift. Will continue to monitor. Problem: Pain:  Goal: Pain level will decrease  Description: Pain level will decrease  Outcome: Ongoing   Pain/discomfort being managed with PRN analgesics per MD orders. Pt able to express presence and absence of pain and rate pain appropriately using numerical scale. Problem: Fluid Volume:  Goal: Will maintain adequate fluid volume  Description: Will maintain adequate fluid volume  Outcome: Ongoing   IVF's as ordered. Monitor labs as ordered. PO intake as tolerated. Problem: Nutritional:  Goal: Ability to achieve adequate nutritional intake will improve  Description: Ability to achieve adequate nutritional intake will improve  Outcome: Ongoing   Clear liquids as tolerated.     Problem: Discharge Planning:  Goal: Discharged to appropriate level of care  Description: Discharged to appropriate level of care  Outcome: Ongoing

## 2021-12-29 NOTE — PROGRESS NOTES
Hospitalist Progress Note      PCP: Jenny Garzon MD    Date of Admission: 12/28/2021        Subjective: Still having some abdominal discomfort. Denies fever chills no more vomiting. Medications:  Reviewed    Infusion Medications    sodium chloride      dextrose      sodium chloride 75 mL/hr at 12/29/21 1100     Scheduled Medications    sodium chloride flush  10 mL IntraVENous 2 times per day    enoxaparin  40 mg SubCUTAneous Nightly    insulin lispro  0-12 Units SubCUTAneous TID WC    insulin lispro  0-6 Units SubCUTAneous Nightly    aspirin EC  81 mg Oral Daily    clopidogrel  75 mg Oral Daily    lisinopril  5 mg Oral Daily    metoprolol tartrate  25 mg Oral BID    pantoprazole  40 mg Oral QAM AC    ranolazine  500 mg Oral BID    rosuvastatin  40 mg Oral Daily    lactobacillus  1 capsule Oral Daily with breakfast     PRN Meds: sodium chloride flush, sodium chloride, potassium chloride **OR** potassium alternative oral replacement **OR** potassium chloride, potassium chloride, magnesium sulfate, promethazine **OR** ondansetron, magnesium hydroxide, acetaminophen **OR** acetaminophen, glucose, dextrose, glucagon (rDNA), dextrose, cetirizine, meclizine, nitroGLYCERIN      Intake/Output Summary (Last 24 hours) at 12/29/2021 1306  Last data filed at 12/29/2021 0616  Gross per 24 hour   Intake 1105.58 ml   Output --   Net 1105.58 ml       Physical Exam Performed:    /64   Pulse 93   Temp 98.5 °F (36.9 °C) (Axillary)   Resp 18   Ht 5' 3\" (1.6 m)   Wt 171 lb 4.8 oz (77.7 kg)   SpO2 95%   BMI 30.34 kg/m²     General appearance: No apparent distress  Neck: Supple  Respiratory:  Normal respiratory effort. Clear to auscultation, bilaterally without Rales/Wheezes/Rhonchi. Cardiovascular: Regular rate and rhythm with normal S1/S2 without murmurs, rubs or gallops. Abdomen: Soft, non-tender, non-distended with normal bowel sounds.   Musculoskeletal: No clubbing, cyanosis  Skin: Skin color, texture, turgor normal.  No rashes or lesions. Neurologic:  No focal weakness   Psychiatric: Alert and oriented  Capillary Refill: Brisk,3 seconds, normal   Peripheral Pulses: +2 palpable, equal bilaterally       Labs:   Recent Labs     12/28/21  1157 12/29/21  0719   WBC 4.3 4.9   HGB 14.4 12.8   HCT 44.5 39.1    228     Recent Labs     12/28/21  1157 12/29/21  0719   * 135*   K 4.1 3.7   CL 98* 104   CO2 22 21   BUN 39* 29*   CREATININE 1.1 0.8   CALCIUM 9.5 8.3     Recent Labs     12/28/21  1157   AST 18   ALT 25   BILITOT 0.4   ALKPHOS 63     No results for input(s): INR in the last 72 hours. Recent Labs     12/28/21  1157   TROPONINI <0.01       Urinalysis:      Lab Results   Component Value Date    NITRU Negative 12/28/2021    WBCUA 2 12/28/2021    RBCUA 6 12/28/2021    BLOODU Negative 12/28/2021    SPECGRAV 1.028 12/28/2021    GLUCOSEU >=1000 12/28/2021    GLUCOSEU NEGATIVE 11/18/2011       Radiology:  CT ABDOMEN PELVIS W IV CONTRAST Additional Contrast? None   Final Result   No acute pathology in the abdomen and pelvis. RECOMMENDATIONS:   Unavailable         XR CHEST PORTABLE   Final Result   No acute cardiopulmonary disease. Assessment/Plan:    Active Hospital Problems    Diagnosis     Hx of acute pancreatitis [Z87.19]      1. Possible Acute pancreatitis, admitted to the hospital, lipase elevated but not significant, will repeat in a.m. IV fluid, continue for now pain management. 2.  Nausea and vomiting, along with diarrhea, antiemetics, GI consulted. 3.  Diabetes mellitus, sliding scale  4. CAD without chest pain  5. Essential hypertension, p.o. medications        Diet: ADULT DIET;  Clear Liquid; 4 carb choices (60 gm/meal)  Code Status: Full Code        Jacinta Peck MD

## 2021-12-29 NOTE — PROGRESS NOTES
Pt up from ED. Pt a&o x4, VSS. Pt oriented to room. Bed in lowest, locked position. Bed alarm on, call light within reach.     Electronically signed by Claudia Townsend RN on 12/28/21 at 7:07 PM EST

## 2021-12-30 LAB
A/G RATIO: 1.3 (ref 1.1–2.2)
ALBUMIN SERPL-MCNC: 3.5 G/DL (ref 3.4–5)
ALP BLD-CCNC: 52 U/L (ref 40–129)
ALT SERPL-CCNC: 20 U/L (ref 10–40)
ANION GAP SERPL CALCULATED.3IONS-SCNC: 11 MMOL/L (ref 3–16)
AST SERPL-CCNC: 19 U/L (ref 15–37)
BILIRUB SERPL-MCNC: 0.3 MG/DL (ref 0–1)
BUN BLDV-MCNC: 15 MG/DL (ref 7–20)
C DIFF TOXIN/ANTIGEN: NORMAL
CALCIUM SERPL-MCNC: 8.6 MG/DL (ref 8.3–10.6)
CHLORIDE BLD-SCNC: 103 MMOL/L (ref 99–110)
CO2: 23 MMOL/L (ref 21–32)
CREAT SERPL-MCNC: 0.8 MG/DL (ref 0.6–1.2)
GFR AFRICAN AMERICAN: >60
GFR NON-AFRICAN AMERICAN: >60
GLUCOSE BLD-MCNC: 163 MG/DL (ref 70–99)
GLUCOSE BLD-MCNC: 169 MG/DL (ref 70–99)
GLUCOSE BLD-MCNC: 185 MG/DL (ref 70–99)
GLUCOSE BLD-MCNC: 195 MG/DL (ref 70–99)
GLUCOSE BLD-MCNC: 195 MG/DL (ref 70–99)
HCT VFR BLD CALC: 37.5 % (ref 36–48)
HEMOGLOBIN: 12.1 G/DL (ref 12–16)
LIPASE: 184 U/L (ref 13–60)
MCH RBC QN AUTO: 27.4 PG (ref 26–34)
MCHC RBC AUTO-ENTMCNC: 32.4 G/DL (ref 31–36)
MCV RBC AUTO: 84.8 FL (ref 80–100)
PDW BLD-RTO: 14 % (ref 12.4–15.4)
PERFORMED ON: ABNORMAL
PLATELET # BLD: 190 K/UL (ref 135–450)
PMV BLD AUTO: 8.3 FL (ref 5–10.5)
POTASSIUM REFLEX MAGNESIUM: 4.1 MMOL/L (ref 3.5–5.1)
POTASSIUM SERPL-SCNC: 4.1 MMOL/L (ref 3.5–5.1)
RBC # BLD: 4.43 M/UL (ref 4–5.2)
SODIUM BLD-SCNC: 137 MMOL/L (ref 136–145)
TOTAL PROTEIN: 6.1 G/DL (ref 6.4–8.2)
WBC # BLD: 4.6 K/UL (ref 4–11)

## 2021-12-30 PROCEDURE — 6370000000 HC RX 637 (ALT 250 FOR IP): Performed by: INTERNAL MEDICINE

## 2021-12-30 PROCEDURE — 2580000003 HC RX 258: Performed by: INTERNAL MEDICINE

## 2021-12-30 PROCEDURE — 87449 NOS EACH ORGANISM AG IA: CPT

## 2021-12-30 PROCEDURE — 87324 CLOSTRIDIUM AG IA: CPT

## 2021-12-30 PROCEDURE — 83690 ASSAY OF LIPASE: CPT

## 2021-12-30 PROCEDURE — 36415 COLL VENOUS BLD VENIPUNCTURE: CPT

## 2021-12-30 PROCEDURE — 80053 COMPREHEN METABOLIC PANEL: CPT

## 2021-12-30 PROCEDURE — 6360000002 HC RX W HCPCS: Performed by: INTERNAL MEDICINE

## 2021-12-30 PROCEDURE — 1200000000 HC SEMI PRIVATE

## 2021-12-30 PROCEDURE — 85027 COMPLETE CBC AUTOMATED: CPT

## 2021-12-30 RX ADMIN — SODIUM CHLORIDE, POTASSIUM CHLORIDE, SODIUM LACTATE AND CALCIUM CHLORIDE: 600; 310; 30; 20 INJECTION, SOLUTION INTRAVENOUS at 12:22

## 2021-12-30 RX ADMIN — INSULIN LISPRO 2 UNITS: 100 INJECTION, SOLUTION INTRAVENOUS; SUBCUTANEOUS at 12:18

## 2021-12-30 RX ADMIN — INSULIN LISPRO 2 UNITS: 100 INJECTION, SOLUTION INTRAVENOUS; SUBCUTANEOUS at 08:15

## 2021-12-30 RX ADMIN — ENOXAPARIN SODIUM 40 MG: 100 INJECTION SUBCUTANEOUS at 22:24

## 2021-12-30 RX ADMIN — METOPROLOL TARTRATE 25 MG: 25 TABLET, FILM COATED ORAL at 22:24

## 2021-12-30 RX ADMIN — Medication 1 CAPSULE: at 08:17

## 2021-12-30 RX ADMIN — METOPROLOL TARTRATE 25 MG: 25 TABLET, FILM COATED ORAL at 08:17

## 2021-12-30 RX ADMIN — LISINOPRIL 5 MG: 5 TABLET ORAL at 22:35

## 2021-12-30 RX ADMIN — RANOLAZINE 500 MG: 500 TABLET, FILM COATED, EXTENDED RELEASE ORAL at 22:25

## 2021-12-30 RX ADMIN — PANTOPRAZOLE SODIUM 40 MG: 40 TABLET, DELAYED RELEASE ORAL at 06:16

## 2021-12-30 RX ADMIN — CLOPIDOGREL BISULFATE 75 MG: 75 TABLET ORAL at 08:17

## 2021-12-30 RX ADMIN — ASPIRIN 81 MG: 81 TABLET, COATED ORAL at 08:17

## 2021-12-30 RX ADMIN — INSULIN LISPRO 1 UNITS: 100 INJECTION, SOLUTION INTRAVENOUS; SUBCUTANEOUS at 22:35

## 2021-12-30 RX ADMIN — SODIUM CHLORIDE, POTASSIUM CHLORIDE, SODIUM LACTATE AND CALCIUM CHLORIDE: 600; 310; 30; 20 INJECTION, SOLUTION INTRAVENOUS at 01:35

## 2021-12-30 RX ADMIN — RANOLAZINE 500 MG: 500 TABLET, FILM COATED, EXTENDED RELEASE ORAL at 08:17

## 2021-12-30 RX ADMIN — INSULIN LISPRO 2 UNITS: 100 INJECTION, SOLUTION INTRAVENOUS; SUBCUTANEOUS at 17:10

## 2021-12-30 RX ADMIN — SODIUM CHLORIDE, POTASSIUM CHLORIDE, SODIUM LACTATE AND CALCIUM CHLORIDE: 600; 310; 30; 20 INJECTION, SOLUTION INTRAVENOUS at 22:24

## 2021-12-30 RX ADMIN — ROSUVASTATIN CALCIUM 40 MG: 40 TABLET, FILM COATED ORAL at 08:17

## 2021-12-30 ASSESSMENT — PAIN SCALES - GENERAL
PAINLEVEL_OUTOF10: 0
PAINLEVEL_OUTOF10: 0

## 2021-12-30 NOTE — PROGRESS NOTES
texture, turgor normal.  No rashes or lesions. Neurologic:  No focal weakness   Psychiatric: Alert and oriented  Capillary Refill: Brisk,3 seconds, normal   Peripheral Pulses: +2 palpable, equal bilaterally       Labs:   Recent Labs     12/28/21  1157 12/29/21  0719 12/30/21  0433   WBC 4.3 4.9 4.6   HGB 14.4 12.8 12.1   HCT 44.5 39.1 37.5    228 190     Recent Labs     12/28/21  1157 12/29/21  0719 12/30/21  0433   * 135* 137   K 4.1 3.7 4.1   CL 98* 104 103   CO2 22 21 23   BUN 39* 29* 15   CREATININE 1.1 0.8 0.8   CALCIUM 9.5 8.3 8.6     Recent Labs     12/28/21  1157 12/30/21  0433   AST 18 19   ALT 25 20   BILITOT 0.4 0.3   ALKPHOS 63 52     No results for input(s): INR in the last 72 hours. Recent Labs     12/28/21 1157   TROPONINI <0.01       Urinalysis:      Lab Results   Component Value Date    NITRU Negative 12/28/2021    WBCUA 2 12/28/2021    RBCUA 6 12/28/2021    BLOODU Negative 12/28/2021    SPECGRAV 1.028 12/28/2021    GLUCOSEU >=1000 12/28/2021    GLUCOSEU NEGATIVE 11/18/2011       Radiology:  CT ABDOMEN PELVIS W IV CONTRAST Additional Contrast? None   Final Result   No acute pathology in the abdomen and pelvis. RECOMMENDATIONS:   Unavailable         XR CHEST PORTABLE   Final Result   No acute cardiopulmonary disease. Assessment/Plan:    Active Hospital Problems    Diagnosis     Hx of acute pancreatitis [Z87.19]      1. Possible Acute pancreatitis, admitted to the hospital, lipase elevated but not significant, decreased this morning, IV fluid, continue for now pain management. 2.  Nausea and vomiting, along with diarrhea, antiemetics, GI consulted. 3.  Diabetes mellitus, sliding scale  4. CAD without chest pain  5. Essential hypertension, p.o. medications    Diet: ADULT DIET;  Clear Liquid; 4 carb choices (60 gm/meal)  Code Status: Full Code      Nona Mayo MD

## 2021-12-30 NOTE — PROGRESS NOTES
Comprehensive Nutrition Assessment    Type and Reason for Visit:  Initial,Positive Nutrition Screen    Nutrition Recommendations/Plan:   - Continue current diet and advance as appropriate per MD  - Monitor intakes for possible need of ONS    Nutrition Assessment:  Positive nutrition screen. Admitted with n/v. Hx pancreatitis and diabetes. Pending Cdiff. No wt loss per care everywhere. Currently on clear liquid diet. Will monitor intakes for possible need of ONS. Malnutrition Assessment:  Malnutrition Status:  No malnutrition    Context:  Chronic Illness       Estimated Daily Nutrient Needs:  Energy (kcal):  2917-7934 (15-18kcal/78kg); Weight Used for Energy Requirements:  Current     Protein (g):  62-73 (1.2-1.4g/52kg); Weight Used for Protein Requirements:  Ideal        Fluid (ml/day):  1 ml/kcal      Nutrition Related Findings:  Glucose 163. No edema. +BM 12/29. Wounds:  None       Current Nutrition Therapies:    ADULT DIET; Clear Liquid; 4 carb choices (60 gm/meal)    Anthropometric Measures:  · Height: 5' 3\" (160 cm)  · Current Body Weight: 171 lb (77.6 kg)   · Admission Body Weight: 171 lb (77.6 kg)   · Ideal Body Weight: 115 lbs; % Ideal Body Weight 148.7 %   · BMI: 30.3   · BMI Categories: Obese Class 1 (BMI 30.0-34. 9)       Nutrition Diagnosis:   · Inadequate oral intake related to inadequate protein-energy intake as evidenced by NPO or clear liquid status due to medical condition,nausea,vomiting    Nutrition Interventions:   Food and/or Nutrient Delivery:  Continue Current Diet (Advance diet as appropriate per MD)  Nutrition Education/Counseling:  Education not indicated   Coordination of Nutrition Care:  Continue to monitor while inpatient    Goals:   Tolerate diet advancement with intakes >50%       Nutrition Monitoring and Evaluation:   Behavioral-Environmental Outcomes:  None Identified   Food/Nutrient Intake Outcomes:  Diet Advancement/Tolerance,Food and Nutrient Intake  Physical Signs/Symptoms Outcomes:  Biochemical Data,GI Status,Nausea or Vomiting,Weight     Discharge Planning:     Too soon to determine     Electronically signed by Brittney Fuller RD, LD on 12/30/21 at 1:23 PM EST    Contact: [phone]

## 2021-12-30 NOTE — PROGRESS NOTES
[facility]  Full note dictated    IMP:  I doubt that this has been pancreatitis  The history is most compatible with an infectious gastroenteritis  Pancreas normal on CT scan  Lipase mildly elevated and decreasing  No abdominal pain, N/V  Abdomen soft, nontender  C diff toxin negative  Patient is afebrile  WBC normal    REC:  Advance diet  If patient remains stable through today,she can be discharged tomorrow  She can f/u with me in the office for any recurrent symptoms

## 2021-12-30 NOTE — CONSULTS
[address] [address]                                  CONSULTATION    PATIENT NAME: Tatiana Daugherty                 :        1950  MED REC NO:   [id number]                          ROOM:       [id number]  ACCOUNT NO:   [de-identified]                           ADMIT DATE: 2021  PROVIDER:     Johna Kava A. Alphonzo Burkitt, MD    CONSULT DATE:  2021    REFERRING PROVIDER:  Jane Wagner MD    HISTORY OF PRESENT ILLNESS:  The patient is a 66-year-old white female  who I saw in  for an EGD. She was admitted with a history of  nausea, vomiting, epigastric pain and diarrhea. This was rather abrupt  in onset. The patient denied any previous history of pancreatitis. She  is status post cholecystectomy. She denied ethanol use. She has been  on no new medications. The serum lipase was mildly elevated and is now  decreasing. The pancreas was entirely normal on CT scan. The patient  has been afebrile with a normal white blood cell count. She is  improving now and is on a liquid diet with no further nausea or  vomiting. A C. diff toxin was negative. PAST MEDICAL HISTORY:  This is remarkable for hypertension, diabetes  mellitus, hyperlipidemia, coronary artery disease, previous CVA and  kidney stones. Prior abdominal surgeries have included an appendectomy,  cholecystectomy and . CURRENT MEDICATIONS:  Please refer to the admission history and physical  examination for the patient's current extensive outpatient drug  treatment. ALLERGIES:  Also refer to the extensive allergy list.    FAMILY HISTORY:  Noncontributory. SOCIAL HISTORY:  The patient is a nonsmoker and nondrinker. PHYSICAL EXAMINATION:  VITAL SIGNS:  Blood pressure 110/64. Pulse 64. Temperature 98.6  degrees orally. GENERAL:  The patient appeared as an alert and cooperative elderly white  female in no acute distress.   HEENT: Sclerae: Anicteric. ABDOMEN:  Soft, nontender, without palpable masses or organomegaly. IMPRESSION:  This presentation is not compatible with pancreatitis. The  lipase was only mildly elevated and is decreasing. The pancreas was  completely normal on CT scan. The history is most compatible with an  infectious gastroenteritis. The patient's abdomen is nontender on exam  and there has been no further nausea or vomiting. PLAN:  The patient's diet will be advanced. If she remains stable  through today, she can be discharged tomorrow. She will then follow up  with me in the office for any recurrent symptoms. TOYA Henderson MD    D: 12/30/2021 13:37:17       T: 12/30/2021 13:41:30     [id number]  Job#: [id number]     Doc#: [id number]    CC:  Md Toya Jordan MD Lerry Gutter, MD

## 2021-12-30 NOTE — PROGRESS NOTES
Tele monitoring , ordered for 48 hours. Patient would like to keep it on, messaged Dr. Darrell Garcia for order to continue.

## 2021-12-30 NOTE — PLAN OF CARE
Problem: Falls - Risk of:  Goal: Will remain free from falls  Description: Will remain free from falls  Outcome: Ongoing  Goal: Absence of physical injury  Description: Absence of physical injury  Outcome: Ongoing     Problem: Pain:  Goal: Pain level will decrease  Description: Pain level will decrease  Outcome: Ongoing  Goal: Control of acute pain  Description: Control of acute pain  Outcome: Ongoing  Goal: Control of chronic pain  Description: Control of chronic pain  Outcome: Ongoing     Problem: Fluid Volume:  Goal: Will maintain adequate fluid volume  Description: Will maintain adequate fluid volume  Outcome: Ongoing     Problem: Nutritional:  Goal: Ability to achieve adequate nutritional intake will improve  Description: Ability to achieve adequate nutritional intake will improve  Outcome: Ongoing     Problem: Discharge Planning:  Goal: Discharged to appropriate level of care  Description: Discharged to appropriate level of care  Outcome: Ongoing

## 2021-12-30 NOTE — PLAN OF CARE
Nutrition Problem #1: Inadequate oral intake  Intervention: Food and/or Nutrient Delivery: Continue Current Diet (Advance diet as appropriate per MD)  Nutritional Goals:  Tolerate diet advancement with intakes >50%

## 2021-12-31 VITALS
SYSTOLIC BLOOD PRESSURE: 132 MMHG | DIASTOLIC BLOOD PRESSURE: 76 MMHG | BODY MASS INDEX: 31.95 KG/M2 | WEIGHT: 180.34 LBS | RESPIRATION RATE: 18 BRPM | TEMPERATURE: 98 F | HEART RATE: 79 BPM | OXYGEN SATURATION: 97 % | HEIGHT: 63 IN

## 2021-12-31 LAB
ANION GAP SERPL CALCULATED.3IONS-SCNC: 12 MMOL/L (ref 3–16)
BUN BLDV-MCNC: 10 MG/DL (ref 7–20)
CALCIUM SERPL-MCNC: 8.7 MG/DL (ref 8.3–10.6)
CHLORIDE BLD-SCNC: 106 MMOL/L (ref 99–110)
CO2: 20 MMOL/L (ref 21–32)
CREAT SERPL-MCNC: 0.7 MG/DL (ref 0.6–1.2)
GFR AFRICAN AMERICAN: >60
GFR NON-AFRICAN AMERICAN: >60
GLUCOSE BLD-MCNC: 166 MG/DL (ref 70–99)
GLUCOSE BLD-MCNC: 196 MG/DL (ref 70–99)
HCT VFR BLD CALC: 37.3 % (ref 36–48)
HEMOGLOBIN: 11.9 G/DL (ref 12–16)
MCH RBC QN AUTO: 27 PG (ref 26–34)
MCHC RBC AUTO-ENTMCNC: 31.9 G/DL (ref 31–36)
MCV RBC AUTO: 84.6 FL (ref 80–100)
PDW BLD-RTO: 14 % (ref 12.4–15.4)
PERFORMED ON: ABNORMAL
PLATELET # BLD: 192 K/UL (ref 135–450)
PMV BLD AUTO: 8.8 FL (ref 5–10.5)
POTASSIUM REFLEX MAGNESIUM: 3.9 MMOL/L (ref 3.5–5.1)
RBC # BLD: 4.41 M/UL (ref 4–5.2)
SODIUM BLD-SCNC: 138 MMOL/L (ref 136–145)
WBC # BLD: 4.2 K/UL (ref 4–11)

## 2021-12-31 PROCEDURE — 85027 COMPLETE CBC AUTOMATED: CPT

## 2021-12-31 PROCEDURE — 6370000000 HC RX 637 (ALT 250 FOR IP): Performed by: INTERNAL MEDICINE

## 2021-12-31 PROCEDURE — 36415 COLL VENOUS BLD VENIPUNCTURE: CPT

## 2021-12-31 PROCEDURE — 80048 BASIC METABOLIC PNL TOTAL CA: CPT

## 2021-12-31 RX ADMIN — PANTOPRAZOLE SODIUM 40 MG: 40 TABLET, DELAYED RELEASE ORAL at 05:31

## 2021-12-31 RX ADMIN — RANOLAZINE 500 MG: 500 TABLET, FILM COATED, EXTENDED RELEASE ORAL at 08:41

## 2021-12-31 RX ADMIN — CLOPIDOGREL BISULFATE 75 MG: 75 TABLET ORAL at 08:41

## 2021-12-31 RX ADMIN — Medication 1 CAPSULE: at 08:41

## 2021-12-31 RX ADMIN — ROSUVASTATIN CALCIUM 40 MG: 40 TABLET, FILM COATED ORAL at 08:41

## 2021-12-31 RX ADMIN — METOPROLOL TARTRATE 25 MG: 25 TABLET, FILM COATED ORAL at 08:41

## 2021-12-31 RX ADMIN — ASPIRIN 81 MG: 81 TABLET, COATED ORAL at 08:41

## 2021-12-31 RX ADMIN — INSULIN LISPRO 2 UNITS: 100 INJECTION, SOLUTION INTRAVENOUS; SUBCUTANEOUS at 08:41

## 2021-12-31 NOTE — DISCHARGE SUMMARY
Hospital Medicine Discharge Summary    Patient ID: Rayo Beatty      Patient's PCP: Krystina Caballero MD    Admit Date: 12/28/2021     Discharge Date:   12/31/2021    Admitting Provider: Beckie Dallas MD     Discharge Provider: David Del Cid MD     Discharge Diagnoses: Active Hospital Problems    Diagnosis     Hx of acute pancreatitis [Z87.19]        The patient was seen and examined on day of discharge and this discharge summary is in conjunction with any daily progress note from day of discharge. Hospital Course:       From HPI:\"Patient is a 66-year-old female with past medical history of CAD diabetes mellitus hypertension hyperlipidemia who presents to the hospital for nausea vomiting. According to patient she has been having vomiting for past 1 week, it has been getting worse, she has difficulty holding down food and medications so she decided to come to the hospital.  Denies using alcohol, previous history of pancreatitis. Patient had cholecystectomy performed but mentions she did not have gallstone issues. Denied fevers chills diarrhea constipation dysuria\"      1.  Possible Acute pancreatitis, admitted to the hospital, lipase elevated but not significant, decreased this morning, IV fluid, continue for now pain management. 2.  Nausea and vomiting, along with diarrhea, antiemetics, GI consulted. Much better, plan to discharge today  3.  Diabetes mellitus, resume home regimen  4.  CAD without chest pain  5.  Essential hypertension, p.o. medications            Physical Exam Performed:     /76   Pulse 79   Temp 98 °F (36.7 °C) (Oral)   Resp 18   Ht 5' 3\" (1.6 m)   Wt 180 lb 5.4 oz (81.8 kg)   SpO2 97%   BMI 31.95 kg/m²       General appearance:  No apparent distress  HEENT:  Normal cephalic  Neck: Supple  Respiratory:  Normal respiratory effort. Clear to auscultation, bilaterally without Rales/Wheezes/Rhonchi.   Cardiovascular:  Regular rate and rhythm with normal S1/S2 without murmurs, rubs or gallops. Abdomen: Soft, non-tender, non-distended   Musculoskeletal:  No clubbing, cyanosis  Skin: Skin color, texture, turgor normal.  No rashes or lesions. Neurologic:  No focal weakness   Psychiatric:  Alert and oriented  Capillary Refill: Brisk,< 3 seconds   Peripheral Pulses: +2 palpable, equal bilaterally       Labs: For convenience and continuity at follow-up the following most recent labs are provided:      CBC:    Lab Results   Component Value Date    WBC 4.2 12/31/2021    HGB 11.9 12/31/2021    HCT 37.3 12/31/2021     12/31/2021       Renal:    Lab Results   Component Value Date     12/31/2021    K 3.9 12/31/2021     12/31/2021    CO2 20 12/31/2021    BUN 10 12/31/2021    CREATININE 0.7 12/31/2021    CALCIUM 8.7 12/31/2021         Significant Diagnostic Studies    Radiology:   CT ABDOMEN PELVIS W IV CONTRAST Additional Contrast? None   Final Result   No acute pathology in the abdomen and pelvis. RECOMMENDATIONS:   Unavailable         XR CHEST PORTABLE   Final Result   No acute cardiopulmonary disease.                 Consults:     IP CONSULT TO HOSPITALIST  IP CONSULT TO GI    Disposition:  home     Condition at Discharge: Stable    Discharge Instructions/Follow-up:  PCP     Code Status:  Full Code     Activity: activity as tolerated    Diet: cardiac diet and diabetic diet      Discharge Medications:     Current Discharge Medication List           Details   fenofibrate (TRIGLIDE) 160 MG tablet TAKE 1 TABLET BY MOUTH EVERY DAY  Qty: 90 tablet, Refills: 3      rosuvastatin (CRESTOR) 40 MG tablet TAKE 1 TABLET BY MOUTH EVERY DAY  Qty: 90 tablet, Refills: 2      clobetasol (TEMOVATE) 0.05 % ointment Apply thin application to affected area at night for four weeks, then every other night for four weeks, then twice weekly for four weeks  Qty: 45 g, Refills: 1    Associated Diagnoses: Lichen sclerosus      metFORMIN (GLUCOPHAGE) 500 MG tablet TAKE 1 TABLET BY MOUTH 2 (TWO) TIMES DAILY WITH MEALS. FOR DIABETES    Associated Diagnoses: Type 2 diabetes mellitus without complication, without long-term current use of insulin ([facility])      saccharomyces boulardii (FLORASTOR) 250 MG capsule Take 250 mg by mouth 2 times daily    Associated Diagnoses: Type 2 diabetes mellitus without complication, without long-term current use of insulin ([facility])      clopidogrel (PLAVIX) 75 MG tablet TAKE 1 TABLET BY MOUTH EVERY DAY  Qty: 90 tablet, Refills: 3      lisinopril (PRINIVIL;ZESTRIL) 5 MG tablet TAKE 1 TABLET BY MOUTH EVERY DAY  Qty: 90 tablet, Refills: 3      ranolazine (RANEXA) 500 MG extended release tablet TAKE 1 TABLET BY MOUTH TWICE A DAY  Qty: 180 tablet, Refills: 3      VASCEPA 1 g CAPS capsule TAKE 2 CAPSULES BY MOUTH TWICE A DAY  Qty: 120 capsule, Refills: 6      nitroGLYCERIN (NITROSTAT) 0.4 MG SL tablet PLACE 1 TABLET UNDER THE TONGUE EVERY 5 MINUTES AS NEEDED FOR CHEST PAIN  Qty: 25 tablet, Refills: 6      glipiZIDE (GLUCOTROL) 5 MG tablet Take 5 mg by mouth 2 times daily (before meals)       dapagliflozin (FARXIGA) 5 MG tablet Take 5 mg by mouth every morning      niacin 500 MG tablet Take 500 mg by mouth 3 times daily      metoprolol (LOPRESSOR) 25 MG tablet Take 1 tablet by mouth 2 times daily. Qty: 180 tablet, Refills: 3      Multiple Vitamins-Minerals (CENTRUM SILVER PO) Take 1 tablet by mouth daily       aspirin EC 81 MG EC tablet Take 1 tablet by mouth daily. Qty: 30 tablet, Refills: 3      omeprazole (PRILOSEC) 20 MG capsule Take 20 mg by mouth Daily       promethazine (PHENERGAN) 25 MG tablet Take 25 mg by mouth every 6 hours as needed for Nausea. meclizine (ANTIVERT) 25 MG tablet Take 25 mg by mouth 3 times daily as needed.       Omega-3 Fatty Acids (FISH OIL BURP-LESS) 1200 MG CAPS Take 2,400 mg by mouth daily       cetirizine (ZYRTEC) 10 MG tablet Take 10 mg by mouth daily as needed for Allergies or Rhinitis              Time Spent on discharge is more than 45 minutes in the examination, evaluation, counseling and review of medications and discharge plan. Signed:    Loyd Haque MD   12/31/2021      Thank you Ana Gonzalez MD for the opportunity to be involved in this patient's care. If you have any questions or concerns please feel free to contact me at [phone].

## 2021-12-31 NOTE — PLAN OF CARE
Problem: Falls - Risk of:  Goal: Will remain free from falls  Description: Will remain free from falls  12/31/2021 1420 by Zee Hernandez RN  Outcome: Completed  12/31/2021 1012 by Zee Hernandez RN  Outcome: Ongoing  Goal: Absence of physical injury  Description: Absence of physical injury  12/31/2021 1420 by Zee Hernandez RN  Outcome: Completed  12/31/2021 1012 by Zee Hernandez RN  Outcome: Ongoing     Problem: Pain:  Goal: Pain level will decrease  Description: Pain level will decrease  12/31/2021 1420 by Zee Hernandez RN  Outcome: Completed  12/31/2021 1012 by Zee Hernandez RN  Outcome: Ongoing  Goal: Control of acute pain  Description: Control of acute pain  12/31/2021 1420 by Zee Hernandez RN  Outcome: Completed  12/31/2021 1012 by Zee Hernandez RN  Outcome: Ongoing  Goal: Control of chronic pain  Description: Control of chronic pain  12/31/2021 1420 by Zee Hernandez RN  Outcome: Completed  12/31/2021 1012 by Zee Hernandez RN  Outcome: Ongoing     Problem: Fluid Volume:  Goal: Will maintain adequate fluid volume  Description: Will maintain adequate fluid volume  12/31/2021 1420 by Zee Hernandez RN  Outcome: Completed  12/31/2021 1012 by Zee Hernandez RN  Outcome: Ongoing

## 2021-12-31 NOTE — CARE COORDINATION
DISCHARGE SUMMARY     DATE OF DISCHARGE: 12/31/2021    DISCHARGE DESTINATION: Home with family. FACILITY: None. TRANSPORTATION: Family will transport. Name: Itz Carey  Relationship: Son  Aurelio Corea up Time: TBD by patient, family and/or medical staff. Phone Number: [phone]    COMMENTS: SW met with patient at bedside regarding discharge needs. She stated that she doesn't have any and her son would be picking her up later today. Respectfully submitted,    BERT NeriS  [facility]   [phone]    Electronically signed by MARCUS Dodson on 12/31/2021 at 11:34 AM

## 2021-12-31 NOTE — PLAN OF CARE
Problem: Falls - Risk of:  Goal: Will remain free from falls  Description: Will remain free from falls  12/31/2021 1012 by Ankur Baker RN  Outcome: Ongoing  12/30/2021 2328 by Erlin Hernandez RN  Outcome: Ongoing  Goal: Absence of physical injury  Description: Absence of physical injury  12/31/2021 1012 by Ankur Baker RN  Outcome: Ongoing  12/30/2021 2328 by Erlin Hernandez RN  Outcome: Ongoing     Problem: Pain:  Goal: Pain level will decrease  Description: Pain level will decrease  12/31/2021 1012 by Ankur Baker RN  Outcome: Ongoing  12/30/2021 2328 by Erlin Hernandez RN  Outcome: Ongoing  Goal: Control of acute pain  Description: Control of acute pain  12/31/2021 1012 by Ankur Baker RN  Outcome: Ongoing  12/30/2021 2328 by Erlin Hernandez RN  Outcome: Ongoing  Goal: Control of chronic pain  Description: Control of chronic pain  12/31/2021 1012 by Ankur Baker RN  Outcome: Ongoing  12/30/2021 2328 by Erlin Hernandez RN  Outcome: Ongoing     Problem: Fluid Volume:  Goal: Will maintain adequate fluid volume  Description: Will maintain adequate fluid volume  12/31/2021 1012 by Ankur Baker RN  Outcome: Ongoing  12/30/2021 2328 by Erlin Hernandez RN  Outcome: Ongoing     Problem: Nutritional:  Goal: Ability to achieve adequate nutritional intake will improve  Description: Ability to achieve adequate nutritional intake will improve  12/31/2021 1012 by Ankur Baker RN  Outcome: Ongoing  12/30/2021 2328 by Erlin Hernandez RN  Outcome: Ongoing     Problem: Discharge Planning:  Goal: Discharged to appropriate level of care  Description: Discharged to appropriate level of care  12/31/2021 1012 by Ankur Baker RN  Outcome: Ongoing  12/30/2021 2328 by Erlin Hernandez RN  Outcome: Ongoing

## 2022-01-03 DIAGNOSIS — L90.0 LICHEN SCLEROSUS: ICD-10-CM

## 2022-01-03 RX ORDER — LISINOPRIL 5 MG/1
TABLET ORAL
Qty: 90 TABLET | Refills: 3 | Status: SHIPPED | OUTPATIENT
Start: 2022-01-03 | End: 2022-03-10 | Stop reason: SDUPTHER

## 2022-01-03 RX ORDER — RANOLAZINE 500 MG/1
TABLET, EXTENDED RELEASE ORAL
Qty: 180 TABLET | Refills: 3 | Status: SHIPPED | OUTPATIENT
Start: 2022-01-03 | End: 2022-01-17

## 2022-01-03 RX ORDER — CLOPIDOGREL BISULFATE 75 MG/1
TABLET ORAL
Qty: 90 TABLET | Refills: 3 | Status: SHIPPED | OUTPATIENT
Start: 2022-01-03 | End: 2022-03-10 | Stop reason: SDUPTHER

## 2022-01-03 RX ORDER — ICOSAPENT ETHYL 1000 MG/1
CAPSULE ORAL
Qty: 360 CAPSULE | Refills: 3 | Status: SHIPPED | OUTPATIENT
Start: 2022-01-03

## 2022-01-03 RX ORDER — FENOFIBRATE 160 MG/1
TABLET ORAL
Qty: 90 TABLET | Refills: 3 | Status: SHIPPED | OUTPATIENT
Start: 2022-01-03

## 2022-01-03 RX ORDER — ROSUVASTATIN CALCIUM 40 MG/1
TABLET, COATED ORAL
Qty: 90 TABLET | Refills: 3
Start: 2022-01-03 | End: 2022-03-10 | Stop reason: SDUPTHER

## 2022-01-03 NOTE — TELEPHONE ENCOUNTER
Medication Refill    Medication needing refilled:  Fenofibrate  Rosuvastatin  plavix  lisinopril  ranexa  vascepa    Dosage of the medication:  160 mg tablet  40 mg tablet  75 mg tablet  5 mg tablet  500 mg tablet  1 g caps    How are you taking this medication (QD, BID, TID, QID, PRN):  TAKE 1 TABLET BY MOUTH EVERY DAY  TAKE 1 TABLET BY MOUTH EVERY DAY  TAKE 1 TABLET BY MOUTH EVERY DAY  TAKE 1 TABLET BY MOUTH EVERY DAY  TAKE 1 TABLET BY MOUTH TWICE A DAY  TAKE 2 CAPSULES BY MOUTH TWICE A DAY    30 or 90 day supply called in:  90  90  90  90  90  90      When will you run out of your medication:  Pt has moved and needs these sent to the pharmacy below     Which Pharmacy are we sending the medication to?:    [address], [place], [address] [facility]

## 2022-01-03 NOTE — TELEPHONE ENCOUNTER
Last OV: 11/16/21  Next OV: not scheduled. Requested to return in 6 to 7 months. Most recent Labs: CMP 12/30/21,  Lipid panel 10/19/21.

## 2022-01-07 NOTE — PROGRESS NOTES
Physician Progress Note      PATIENT:               Ana Maloney  CSN #:                  [id number]  :                       1950  ADMIT DATE:       2021 10:11 AM  DISCH DATE:        2021 2:21 PM  RESPONDING  PROVIDER #:        Bradly Santana MD          QUERY TEXT:    Patient with vomiting, epigastric pain, and diarrhea x 1 week admitted with   suspected acute pancreatitis. Lipase 258 on arrival down to 184 at   discharge. Per GI consult note \"This presentation is not compatible with   pancreatitis. The lipase was only mildly elevated and is decreasing. The   pancreas was completely normal on CT scan. The history is most compatible   with an infectious gastroenteritis. \"  Noted documentation of \"Possible acute   pancreatitis\" in Discharge Summary. If possible, please document in progress notes and discharge summary if you   are evaluating and /or treating any of the following: The medical record reflects the following:  Risk Factors: hx previous acute pancreatitis  Clinical Indicators: epigastric pain, nausea, vomiting, lipase 258 down to   184, \"presentation not compatible with acute pancreatitis\" and infectious   gastroenteritis\" documented per GI consult  Treatment: IV fluids, GI consult    Thank you,  Glen Rahman RN, BSN, CCDS  [email or web address]  Options provided:  -- Infectious gastroenteritis present on admission and acute pancreatitis   ruled out  -- Acute pancreatitis present on admission  -- Other - I will add my own diagnosis  -- Disagree - Not applicable / Not valid  -- Disagree - Clinically unable to determine / Unknown  -- Refer to Clinical Documentation Reviewer    PROVIDER RESPONSE TEXT:    Infectious gastroenteritis present on admission and acute pancreatitis ruled   out.     Query created by: Marilyn Barrow on 2022 5:30 AM      Electronically signed by:  Bradly Santana MD 2022 12:34 PM

## 2022-01-17 RX ORDER — RANOLAZINE 500 MG/1
TABLET, EXTENDED RELEASE ORAL
Qty: 180 TABLET | Refills: 1 | Status: SHIPPED | OUTPATIENT
Start: 2022-01-17 | End: 2022-04-25 | Stop reason: SDUPTHER

## 2022-01-17 NOTE — TELEPHONE ENCOUNTER
Last OV: 11/16/21  Next OV: 7 mth f/u 6/2022  Last refill:1/3/22  Most recent Labs: 12/31/21  Last EKG (if needed):12/28/21

## 2022-02-07 RX ORDER — NITROGLYCERIN 0.4 MG/1
0.4 TABLET SUBLINGUAL EVERY 5 MIN PRN
Qty: 25 TABLET | Refills: 6 | Status: SHIPPED | OUTPATIENT
Start: 2022-02-07

## 2022-02-07 NOTE — TELEPHONE ENCOUNTER
Medication Refill    Medication needing refilled:  nitroGLYCERIN (NITROSTAT)      Dosage of the medication:  0.4 MG SL tablet       How are you taking this medication (QD, BID, TID, QID, PRN):  PLACE 1 TABLET UNDER THE TONGUE EVERY 5 MINUTES AS NEEDED FOR CHEST PAIN    30 or 90 day supply called in:30      Which Pharmacy are we sending the medication to?:  [facility], [address] [place] [address] Gael [phone]   [facility] [address] [address]   Phone:  [phone]  Fax:  [phone]

## 2022-02-07 NOTE — TELEPHONE ENCOUNTER
Last OV: 11/16/2021  Next OV:   Last refill:  Most recent Labs: cbc, bmp 12/31/2021  Last EKG (if needed):12/28/2021

## 2022-03-10 ENCOUNTER — OFFICE VISIT (OUTPATIENT)
Dept: CARDIOLOGY CLINIC | Age: 72
End: 2022-03-10
Payer: MEDICARE

## 2022-03-10 VITALS
RESPIRATION RATE: 18 BRPM | HEART RATE: 68 BPM | OXYGEN SATURATION: 98 % | BODY MASS INDEX: 27.36 KG/M2 | WEIGHT: 154.4 LBS | HEIGHT: 63 IN | SYSTOLIC BLOOD PRESSURE: 120 MMHG | DIASTOLIC BLOOD PRESSURE: 70 MMHG

## 2022-03-10 DIAGNOSIS — I63.9 CEREBROVASCULAR ACCIDENT (CVA), UNSPECIFIED MECHANISM (HCC): ICD-10-CM

## 2022-03-10 DIAGNOSIS — E11.9 TYPE 2 DIABETES MELLITUS WITHOUT COMPLICATION, WITHOUT LONG-TERM CURRENT USE OF INSULIN (HCC): ICD-10-CM

## 2022-03-10 DIAGNOSIS — E78.2 MIXED HYPERLIPIDEMIA: ICD-10-CM

## 2022-03-10 DIAGNOSIS — I25.10 CORONARY ARTERY DISEASE INVOLVING NATIVE CORONARY ARTERY OF NATIVE HEART WITHOUT ANGINA PECTORIS: Primary | ICD-10-CM

## 2022-03-10 DIAGNOSIS — I10 PRIMARY HYPERTENSION: ICD-10-CM

## 2022-03-10 PROCEDURE — G8400 PT W/DXA NO RESULTS DOC: HCPCS | Performed by: INTERNAL MEDICINE

## 2022-03-10 PROCEDURE — 4040F PNEUMOC VAC/ADMIN/RCVD: CPT | Performed by: INTERNAL MEDICINE

## 2022-03-10 PROCEDURE — 99204 OFFICE O/P NEW MOD 45 MIN: CPT | Performed by: INTERNAL MEDICINE

## 2022-03-10 PROCEDURE — 1036F TOBACCO NON-USER: CPT | Performed by: INTERNAL MEDICINE

## 2022-03-10 PROCEDURE — G8417 CALC BMI ABV UP PARAM F/U: HCPCS | Performed by: INTERNAL MEDICINE

## 2022-03-10 PROCEDURE — 2022F DILAT RTA XM EVC RTNOPTHY: CPT | Performed by: INTERNAL MEDICINE

## 2022-03-10 PROCEDURE — 1090F PRES/ABSN URINE INCON ASSESS: CPT | Performed by: INTERNAL MEDICINE

## 2022-03-10 PROCEDURE — 3046F HEMOGLOBIN A1C LEVEL >9.0%: CPT | Performed by: INTERNAL MEDICINE

## 2022-03-10 PROCEDURE — G8427 DOCREV CUR MEDS BY ELIG CLIN: HCPCS | Performed by: INTERNAL MEDICINE

## 2022-03-10 PROCEDURE — G8484 FLU IMMUNIZE NO ADMIN: HCPCS | Performed by: INTERNAL MEDICINE

## 2022-03-10 PROCEDURE — 3017F COLORECTAL CA SCREEN DOC REV: CPT | Performed by: INTERNAL MEDICINE

## 2022-03-10 PROCEDURE — 1123F ACP DISCUSS/DSCN MKR DOCD: CPT | Performed by: INTERNAL MEDICINE

## 2022-03-10 RX ORDER — LISINOPRIL 5 MG/1
TABLET ORAL
Qty: 90 TABLET | Refills: 3 | Status: SHIPPED | OUTPATIENT
Start: 2022-03-10

## 2022-03-10 RX ORDER — CLOPIDOGREL BISULFATE 75 MG/1
TABLET ORAL
Qty: 90 TABLET | Refills: 3 | Status: SHIPPED | OUTPATIENT
Start: 2022-03-10 | End: 2022-04-25 | Stop reason: SDUPTHER

## 2022-03-10 RX ORDER — ROSUVASTATIN CALCIUM 40 MG/1
TABLET, COATED ORAL
Qty: 90 TABLET | Refills: 3 | Status: SHIPPED | OUTPATIENT
Start: 2022-03-10

## 2022-03-25 ENCOUNTER — HOSPITAL ENCOUNTER (OUTPATIENT)
Dept: NON INVASIVE DIAGNOSTICS | Age: 72
Discharge: HOME | End: 2022-03-25
Payer: MEDICARE

## 2022-03-25 DIAGNOSIS — I25.10 CORONARY ARTERY DISEASE INVOLVING NATIVE CORONARY ARTERY OF NATIVE HEART WITHOUT ANGINA PECTORIS: ICD-10-CM

## 2022-03-25 LAB
LV EF: 65 %
LVEF MODALITY: NORMAL

## 2022-03-25 PROCEDURE — 93306 TTE W/DOPPLER COMPLETE: CPT

## 2022-03-29 ENCOUNTER — TELEPHONE (OUTPATIENT)
Dept: CARDIOLOGY CLINIC | Age: 72
End: 2022-03-29

## 2022-04-25 RX ORDER — CLOPIDOGREL BISULFATE 75 MG/1
TABLET ORAL
Qty: 90 TABLET | Refills: 3 | Status: SHIPPED | OUTPATIENT
Start: 2022-04-25

## 2022-04-25 RX ORDER — RANOLAZINE 500 MG/1
TABLET, EXTENDED RELEASE ORAL
Qty: 180 TABLET | Refills: 3 | Status: SHIPPED | OUTPATIENT
Start: 2022-04-25

## 2022-04-25 NOTE — TELEPHONE ENCOUNTER
Needing refills on Ranexa 500 mg, BID #180, and Plavix 75 mg, #90, QD, sent to [facility], [phone]. She wants these medications in Dr. Jesus Mancia name.
Pt can only take name brand Ranexa.
Received refill request for Plavix, Ranexa from [address] [place] [address].     Last ov: 03/10/2022 [facility]    Next appointment: 03/10/2023  GIA (recall)
65

## 2022-04-28 ENCOUNTER — TELEPHONE (OUTPATIENT)
Dept: CARDIOLOGY CLINIC | Age: 72
End: 2022-04-28

## 2022-04-28 NOTE — TELEPHONE ENCOUNTER
Refills were sent by Dr. Tommie Ramirez 1/3/22. I spoke to Yan Wall. She would like to switch names on her Rx's to Dr. Bill Marquez. The two Rx's are pended. It is unclear if the dentist wants her to hold the Plavix and asa but the MA told her to ask if she should. She is having one tooth pulled.

## 2022-04-28 NOTE — TELEPHONE ENCOUNTER
Pt requesting refills on the Fenofibrate 160 mg, one QD, # 90, and Vascepa 1 g, two caps BID, # 360, sent to [facility], [phone]. Also stated she is having a tooth pulled 5/4/22-will she need to hold the Plavix, and Aspirin?

## 2022-05-04 RX ORDER — ICOSAPENT ETHYL 1000 MG/1
CAPSULE ORAL
Qty: 360 CAPSULE | Refills: 3 | OUTPATIENT
Start: 2022-05-04

## 2022-05-04 RX ORDER — FENOFIBRATE 160 MG/1
TABLET ORAL
Qty: 90 TABLET | Refills: 3 | OUTPATIENT
Start: 2022-05-04

## 2022-05-04 NOTE — TELEPHONE ENCOUNTER
Patient called back to check on message regarding refill request. Informed her that she has refill on these two med's and that she can pick them up at her convenience. She stated that the pharmacy told her that she needs request refills on the two. Called the pharmacy and informed them that she has a year supply of refills on the fenofibrate and vescepa. They will fill her medication and it will be ready for  after 12:00 noon Thursday. Pt was on hold and knows that this will be ready.  She was informed that she can have a new rx when the script runs out in 1 yr for the convenience

## 2022-08-25 ENCOUNTER — OFFICE VISIT (OUTPATIENT)
Dept: ORTHOPEDIC SURGERY | Age: 72
End: 2022-08-25
Payer: MEDICARE

## 2022-08-25 VITALS — BODY MASS INDEX: 27.29 KG/M2 | WEIGHT: 154 LBS | HEIGHT: 63 IN

## 2022-08-25 DIAGNOSIS — M54.50 LUMBAR PAIN: Primary | ICD-10-CM

## 2022-08-25 DIAGNOSIS — M51.36 DDD (DEGENERATIVE DISC DISEASE), LUMBAR: ICD-10-CM

## 2022-08-25 PROCEDURE — 3017F COLORECTAL CA SCREEN DOC REV: CPT | Performed by: PHYSICIAN ASSISTANT

## 2022-08-25 PROCEDURE — G8427 DOCREV CUR MEDS BY ELIG CLIN: HCPCS | Performed by: PHYSICIAN ASSISTANT

## 2022-08-25 PROCEDURE — 1090F PRES/ABSN URINE INCON ASSESS: CPT | Performed by: PHYSICIAN ASSISTANT

## 2022-08-25 PROCEDURE — G8400 PT W/DXA NO RESULTS DOC: HCPCS | Performed by: PHYSICIAN ASSISTANT

## 2022-08-25 PROCEDURE — 99204 OFFICE O/P NEW MOD 45 MIN: CPT | Performed by: PHYSICIAN ASSISTANT

## 2022-08-25 PROCEDURE — 1036F TOBACCO NON-USER: CPT | Performed by: PHYSICIAN ASSISTANT

## 2022-08-25 PROCEDURE — G8417 CALC BMI ABV UP PARAM F/U: HCPCS | Performed by: PHYSICIAN ASSISTANT

## 2022-08-25 PROCEDURE — 1123F ACP DISCUSS/DSCN MKR DOCD: CPT | Performed by: PHYSICIAN ASSISTANT

## 2022-08-25 RX ORDER — METHYLPREDNISOLONE 4 MG/1
4 TABLET ORAL SEE ADMIN INSTRUCTIONS
Qty: 1 KIT | Refills: 0 | Status: SHIPPED | OUTPATIENT
Start: 2022-08-25 | End: 2022-08-31

## 2022-08-25 NOTE — PROGRESS NOTES
New Patient: LUMBAR SPINE    Referring Provider:  No ref. provider found    CHIEF COMPLAINT:    Chief Complaint   Patient presents with    Back Pain     lumbar       HISTORY OF PRESENT ILLNESS:       Ms. Antonio Espinoza  is a pleasant 70 y.o. female here for evaluation regarding her LBP and left leg pain. She states her pain began after pulling weeds approximately 6 weeks ago . Her pain has steadily continued since then. She rates her back pain 10/10, left buttock pain 10/10, and left leg to calf pain 10/10 pain. Patient states that her pain is worse with prolonged sitting, standing, rising from a seated position, and at times walking and improved with leaning forward and lying down. She denies any progressive weakness into either lower extremity but does experience paresthesias into her left leg. Pain is mainly over the posterior aspect of the left leg to the foot. Patient denies any bowel or bladder dysfunction or saddle anesthesia. Patient has a history of a previous lumbar laminectomy 30 years ago.   Current/Past Treatment:   Physical Therapy: None  Chiropractic: None  Injection: None  Medications: Aleve    Past Medical History:   Past Medical History:   Diagnosis Date    CAD (coronary artery disease)     Cerebral artery occlusion with cerebral infarction ([facility]     Diabetes mellitus ([facility]     Hyperlipidemia     Hypertension     Kidney calculi     lithitripsy x2 2009 dec    Pneumonia 2009    Vegetarian diet         Past Surgical History:     Past Surgical History:   Procedure Laterality Date    APPENDECTOMY      BACK SURGERY       SECTION      CHOLECYSTECTOMY      CORONARY ANGIOPLASTY WITH STENT PLACEMENT      LITHOTRIPSY      TONSILLECTOMY      UPPER GASTROINTESTINAL ENDOSCOPY         Current Medications:     Current Outpatient Medications:     methylPREDNISolone (MEDROL, GOGO,) 4 MG tablet, Take 1 tablet by mouth See Admin Instructions for 6 days Take by mouth., Disp: 1 kit, Rfl: 0 clopidogrel (PLAVIX) 75 MG tablet, TAKE 1 TABLET BY MOUTH EVERY DAY, Disp: 90 tablet, Rfl: 3    ranolazine (RANEXA) 500 MG extended release tablet, Take 1 tablet po BID, Disp: 180 tablet, Rfl: 3    lisinopril (PRINIVIL;ZESTRIL) 5 MG tablet, TAKE 1 TABLET BY MOUTH EVERY DAY, Disp: 90 tablet, Rfl: 3    rosuvastatin (CRESTOR) 40 MG tablet, One tablet daily, Disp: 90 tablet, Rfl: 3    nitroGLYCERIN (NITROSTAT) 0.4 MG SL tablet, Place 1 tablet under the tongue every 5 minutes as needed for Chest pain, Disp: 25 tablet, Rfl: 6    Icosapent Ethyl (VASCEPA) 1 g CAPS capsule, TAKE 2 CAPSULES BY MOUTH TWICE A DAY, Disp: 360 capsule, Rfl: 3    fenofibrate (TRIGLIDE) 160 MG tablet, TAKE 1 TABLET BY MOUTH EVERY DAY, Disp: 90 tablet, Rfl: 3    metFORMIN (GLUCOPHAGE) 500 MG tablet, TAKE 1 TABLET BY MOUTH 2 (TWO) TIMES DAILY WITH MEALS. FOR DIABETES, Disp: , Rfl:     saccharomyces boulardii (FLORASTOR) 250 MG capsule, Take 250 mg by mouth 2 times daily, Disp: , Rfl:     glipiZIDE (GLUCOTROL) 5 MG tablet, Take 5 mg by mouth 2 times daily (before meals) , Disp: , Rfl:     dapagliflozin (FARXIGA) 5 MG tablet, Take 5 mg by mouth every morning, Disp: , Rfl:     niacin 500 MG tablet, Take 500 mg by mouth 3 times daily, Disp: , Rfl:     metoprolol (LOPRESSOR) 25 MG tablet, Take 1 tablet by mouth 2 times daily. , Disp: 180 tablet, Rfl: 3    aspirin EC 81 MG EC tablet, Take 1 tablet by mouth daily. , Disp: 30 tablet, Rfl: 3    omeprazole (PRILOSEC) 20 MG capsule, Take 20 mg by mouth Daily , Disp: , Rfl:     promethazine (PHENERGAN) 25 MG tablet, Take 25 mg by mouth every 6 hours as needed for Nausea., Disp: , Rfl:     meclizine (ANTIVERT) 25 MG tablet, Take 25 mg by mouth 3 times daily as needed. , Disp: , Rfl:     Omega-3 Fatty Acids (FISH OIL BURP-LESS) 1200 MG CAPS, Take 2,400 mg by mouth daily , Disp: , Rfl:     cetirizine (ZYRTEC) 10 MG tablet, Take 10 mg by mouth daily as needed for Allergies or Rhinitis , Disp: , Rfl:     Allergies: Effient [prasugrel], Ranolazine, Azithromycin, Dilaudid [hydromorphone hcl], Dolobid [diflunisal], Doxycycline, Ranolazine er, Vibramycin [doxycycline calcium], and Morphine sulfate    Social History:    reports that she has never smoked. She has never used smokeless tobacco. She reports that she does not drink alcohol and does not use drugs. Family History:   Family History   Problem Relation Age of Onset    Other Mother     Other Father     Diabetes Maternal Grandmother        REVIEW OF SYSTEMS: Full ROS noted & scanned   CONSTITUTIONAL: Denies unexplained weight loss, fevers, chills or fatigue  NEUROLOGICAL: Denies unsteady gait or progressive weakness  MUSCULOSKELETAL: Denies joint swelling or redness  PSYCHOLOGICAL: Denies anxiety, depression   SKIN: Denies skin changes, delayed healing, rash, itching   HEMATOLOGIC: Denies easy bleeding or bruising  ENDOCRINE: Denies excessive thirst, urination, heat/cold  RESPIRATORY: Denies current dyspnea, cough  GI: Denies nausea, vomiting, diarrhea   : Denies bowel or bladder issues      PHYSICAL EXAM:    Vitals: Height 5' 2.99\" (1.6 m), weight 154 lb (69.9 kg), not currently breastfeeding. GENERAL EXAM:  General Apparence: Patient is adequately groomed with no evidence of malnutrition. Orientation: The patient is oriented to time, place and person. Mood & Affect:The patient's mood and affect are appropriate. Vascular: Examination reveals no swelling tenderness in upper or lower extremities. Good capillary refill. Lymphatic: The lymphatic examination bilaterally reveals all areas to be without enlargement or induration  Sensation: Sensation is intact without deficit  Coordination/Balance: Good coordination. LUMBAR/SACRAL EXAMINATION:  Inspection: Local inspection shows no step-off or bruising. Lumbar alignment is normal.  Sagittal and Coronal balance is neutral.      Palpation:   No evidence of tenderness at the midline.   No tenderness bilaterally at the paraspinal or trochanters. There is no step-off or paraspinal spasm. Range of Motion: Lumbar flexion, extension and rotation are mildly limited due to pain. Strength:   Strength testing is 5/5 in all muscle groups tested. Special Tests:   Straight leg raise and crossed SLR negative. Leg length and pelvis level. Skin: There are no rashes, ulcerations or lesions. Reflexes: Reflexes are symmetrically 2+ at the patellar and ankle tendons. Clonus absent bilaterally at the feet. Gait & station: normal, patient ambulates without assistance    Additional Examinations:   RIGHT LOWER EXTREMITY: Inspection/examination of the right lower extremity does not show any tenderness, deformity or injury. Range of motion is unremarkable. There is no gross instability. There are no rashes, ulcerations or lesions. Strength and tone are normal.  LEFT LOWER EXTREMITY:  Inspection/examination of the left lower extremity does not show any tenderness, deformity or injury. Range of motion is unremarkable. There is no gross instability. There are no rashes, ulcerations or lesions. Strength and tone are normal.    Diagnostic Testing:    X-rays: 2 views of the lumbar spine include AP and lateral were obtained today in the office and independently reviewed with the patient shows well-maintained lumbar lordosis with disc space narrowing L3-4 L4-5 L5 1. There is significant facet arthropathy noted at L4-S1. Impression:   DDD lumbar spine  Facet arthropathy      Plan:      We discussed the diagnosis and treatment options including observation, additional oral steroids, physical therapy, epidural injections and additional imaging. She wishes to proceed with Medrol Dosepak to be taken as prescribed and she was referred to outpatient physical therapy for pelvic stabilization and core strengthening exercises with modalities of choice to include dry needling.   If she finds that she has had no durable benefit from these conservative treatments she will contact the office for for scheduling of a lumbar MRI. .    Follow up -as needed    Old records were reviewed.   Sridevi Hahn PA-C  Board certified by the [facility] [facility]

## 2022-09-07 ENCOUNTER — HOSPITAL ENCOUNTER (OUTPATIENT)
Dept: PHYSICAL THERAPY | Age: 72
Setting detail: THERAPIES SERIES
Discharge: HOME | End: 2022-09-07
Payer: MEDICARE

## 2022-09-07 PROCEDURE — 97161 PT EVAL LOW COMPLEX 20 MIN: CPT

## 2022-09-07 PROCEDURE — 97110 THERAPEUTIC EXERCISES: CPT

## 2022-09-07 NOTE — FLOWSHEET NOTE
[facility] and [address],  [facility], Formerly [facility] [address]199 [address]  793 [facility],5Th Floor   [place]  Phone: [phone]  Fax: [phone]     Physical Therapy Daily Treatment Note  Date:  2022    Patient Name:  Mateusz Lorenzo    :  1950  MRN: [id number]  Restrictions/Precautions:    Medical/Treatment Diagnosis Information:  Diagnosis: M54.50 (ICD-10-CM) - Lumbar pain, M51.36 (ICD-10-CM) - DDD (degenerative disc disease), lumbar  Treatment Diagnosis: M54.50 Low Back Pain  Insurance/Certification information:  PT Insurance Information: [employer]  Physician Information:   Carie Fields PA-C   Has the plan of care been signed (Y/N):        []  Yes  [x]  No     Date of Patient follow up with Physician: not scheduled      Is this a Progress Report:     []  Yes  [x]  No        If Yes:  Date Range for reporting period:  Beginning 22  Ending 10/7/22    Progress report will be due (10 Rx or 30 days whichever is less):         Recertification will be due (POC Duration  / 90 days whichever is less): 22         Visit # Insurance Allowable Auth Required   1  [x]  Yes []  No        Functional Scale: FOTO: 50    Date assessed:  22     Latex Allergy:  [x]NO      []YES  Preferred Language for Healthcare:   [x]English       []other:    Pain level:  4/10     SUBJECTIVE:  See eval    OBJECTIVE: See eval  Observation:   Test measurements:      RESTRICTIONS/PRECAUTIONS:   HEP: [id number]  Exercises/Interventions:   ROM/stretches     SKTC     DKTC     Prayer stretch     Supine HS 1 min     Pelvic tilt     Hook lying rotation x10B    Cat and camel     Piriformis      Strengthening     SLR     Quadruped alternate UE reaches     Quadruped alternate LE reaches     Quadruped alternate UE/LE reaches     Ball squats     Ball heel raises     Sit ups      planks     Tband lat pulls     Tband rows     TA 10\"x10 Bridges x10    Supine clam X15 maroon        Manual Intervention             Prone PA      GISTM/STM      Lumbar Manip      SI Manip      Hip belt mobs      Hip LA distraction              Therapeutic Exercise and NMR EXR  [] (03495) Provided verbal/tactile cueing for activities related to strengthening, flexibility, endurance, ROM  for improvements in proximal hip and core control with self care, mobility, lifting and ambulation.  [] (58915) Provided verbal/tactile cueing for activities related to improving balance, coordination, kinesthetic sense, posture, motor skill, proprioception  to assist with core control in self care, mobility, lifting, and ambulation.      Therapeutic Activities:    [] (44021 or 71204) Provided verbal/tactile cueing for activities related to improving balance, coordination, kinesthetic sense, posture, motor skill, proprioception and motor activation to allow for proper function  with self care and ADLs  [] (59735) Provided training and instruction to the patient for proper core and proximal hip recruitment and positioning with ambulation re-education     Home Exercise Program:    [x] (96378) Reviewed/Progressed HEP activities related to strengthening, flexibility, endurance, ROM of core, proximal hip and LE for functional self-care, mobility, lifting and ambulation   [] (09292) Reviewed/Progressed HEP activities related to improving balance, coordination, kinesthetic sense, posture, motor skill, proprioception of core, proximal hip and LE for self care, mobility, lifting, and ambulation      Manual Treatments:  PROM / STM / Oscillations-Mobs:  G-I, II, III, IV (PA's, Inf., Post.)  [] (15508) Provided manual therapy to mobilize proximal hip and LS spine soft tissue/joints for the purpose of modulating pain, promoting relaxation,  increasing ROM, reducing/eliminating soft tissue swelling/inflammation/restriction, improving soft tissue extensibility and allowing for proper ROM for normal function with self care, mobility, lifting and ambulation. Modalities:       Charges:  Timed Code Treatment Minutes: 15   Total Treatment Minutes: 45       [x] EVAL (LOW) 56991 (typically 20 minutes face-to-face)  [] EVAL (MOD) 83094 (typically 30 minutes face-to-face)  [] EVAL (HIGH) 14160 (typically 45 minutes face-to-face)  [] RE-EVAL     [x] CM(73077) x     [] IONTO  [] NMR (52996) x     [] VASO  [] Manual (06355) x      [] Other:  [] TA x      [] Mech Traction (76067)  [] ES(attended) (82599)      [] ES (un) (11606):     GOALS:   Patient stated goal: Improve back and leg pain     [] Progressing: [] Met: [] Not Met: [] Adjusted     Therapist goals for Patient:   Short Term Goals: To be achieved in: 2 weeks  1. Independent in HEP and progression per patient tolerance, in order to prevent re-injury. [] Progressing: [] Met: [] Not Met: [] Adjusted   2. Patient will have a decrease in pain to facilitate improvement in movement, function, and ADLs as indicated by Functional Deficits. [] Progressing: [] Met: [] Not Met: [] Adjusted     Long Term Goals: To be achieved in: 5 weeks  1. Will Improve FOTO by 5 points to demonstrate improvement in function   [] Progressing: [] Met: [] Not Met: [] Adjusted  2. Patient will demonstrate increased AROM to WNL, good LS mobility, good hip ROM to allow for proper joint functioning as indicated by patients Functional Deficits. [] Progressing: [] Met: [] Not Met: [] Adjusted  3. Patient will demonstrate an increase in Strength to good proximal hip and core activation to allow for proper functional mobility as indicated by patients Functional Deficits. [] Progressing: [] Met: [] Not Met: [] Adjusted  4. Patient will return to daily functional activities without increased symptoms or restriction. [] Progressing: [] Met: [] Not Met: [] Adjusted  5.  Pt will report at least a 25-50% improvement in frequency and intensity of back/leg pain   [] Progressing: [] Met: [] Not

## 2022-09-07 NOTE — PLAN OF CARE
[facility] and [address],  Sports Performance and Rehabilitation, [facility] [address]  793 [facility],5Th Floor   [place]  Phone: [phone]  Fax: [phone]       Physical Therapy Certification    Dear Heriberto Hicks PA-C  ,    We had the pleasure of evaluating the following patient for physical therapy services at [address] [place] [address]. A summary of our findings can be found in the initial assessment below. This includes our plan of care. If you have any questions or concerns regarding these findings, please do not hesitate to contact me at the office phone number checked above. Thank you for the referral.       Physician Signature:_______________________________Date:__________________  By signing above (or electronic signature), therapists plan is approved by physician      Patient: Palomo Chávez   : 1950   MRN: [id number]  Referring Physician:  Heriberto Hicks PA-C      Evaluation Date: 2022      Medical Diagnosis Information:  Diagnosis: M54.50 (ICD-10-CM) - Lumbar pain, M51.36 (ICD-10-CM) - DDD (degenerative disc disease), lumbar   Treatment Diagnosis: M54.50 Low Back Pain                                         Insurance information: PT Insurance Information: [employer]     Precautions/ Contra-indications: None    C-SSRS Triggered by Intake questionnaire (Past 2 wk assessment):   [x] No, Questionnaire did not trigger screening.   [] Yes, Patient intake triggered further evaluation      [] C-SSRS Screening completed  [] PCP notified via Plan of Care  [] Emergency services notified     Latex Allergy:  [x]NO      []YES  Preferred Language for Healthcare:   [x]English       []other:    SUBJECTIVE: Patient stated complaint:Pt has been having low back pain and L leg pain for about 6 weeks.  Back and leg pain are 10/10 at its worst. Gets worse with prolonged sitting, standing, transitional movements, and at time walking. Pain is better with leaning forward and laying down. Denies weakness but does have numbness going down back of L leg. No B/B issues. Pt had back surgery back when she was 40. Pt bent forward to cut some of the flowers and felt severe pain in back, in to buttock, and in to L leg. Pain is the wort in the back of the L thing and the buttock area. Pt is wearing back brace. Pt uses this when she feels some pain. Pt said her back pain seems \"pretty resolved\". Pt got an x-ray. Pt has arthritis in the back. It was recommended to perform PT. Pt had open heart surgery and does not want to do any other surgeries. Walking helps with pain. Squatting increases pain. Pt said numbness and tingling is every day. Pain is less consistent. If pt lays flat, she does not feel. Sleep is not affected. Pt does drive. Stairs can also make it worse. Pt got prednisone and she is done taking it. Pt does not have anything specific she wants to get back to. Relevant Medical History:Open heart surgery   Functional Disability Index/G-Codes: FOTO:     Pain Scale: 4/10 (6-7/10)  Easing factors: rest, ice, walking helps, siting with ice helps   Provocative factors: transitional movements, stairs, squatting.       Type: []Constant           [x]Intermittent      []Radiating         []Localized         []other:                Numbness/Tingling: Down posterior aspect of L leg to the foot      Occupation/School: Retired      Living Status/Prior Level of Function: Independent with ADLs and IADLs,      OBJECTIVE:   ROM   Comments   Trunk flexion 10% limitation     Trunk extension     Trunk R sidebend 25% limitation    Trunk L sidebend 25% limtiation    Trunk R rotation 25%limitation    Trunk L rotation 25% limitation    HS flexibility Moderated restriction B                        Strength Left Right Comments   Hip flexion(L2) 4- 4    Knee extension(L3) 4 4+    Knee flexion(S1-2) 4- 4    Ankle dorsiflexion(L4) 4+ 4+    Toe extension(L5) 5 5    Ankle eversion/plantar flexion(S1) 5 5    Hip abd   3 3         Special tests   Comments   SLR Negative     Slump test Negative      Pelvic symmetry  WNL     Segmental Spinal mobility Hypomobile L2-L5     Heel walk      Toe walk      Tandem walk                 DTRs Left Right Comments   Patellar(L3-L4) Hypo  Hypo     Achilles(S1-S2) Hypro Hypro                  Joint mobility:               []Normal               [x]Hypo              []Hyper     Palpation: No TTP      Functional Mobility/Transfers: independent: squat test: quad dominant, knee valgus, tibial IR. SLS: unable to hold 5 seconds, trendelenburg B,      Posture: mild rounded shoulders forward head. Standing: decreased knee extension, increased hip flexion, decreased lumbar lordosis     Bandages/Dressings/Incisions: N/A      Gait: (include devices/WB status) decreased toe off, decreased hip extension, decreased trunk rotation, quad dominant, trendelenburg L>R                          [x] Patient history, allergies, meds reviewed. Medical chart reviewed. See intake form. Review Of Systems (ROS):  [x]Performed Review of systems (Integumentary, CardioPulmonary, Neurological) by intake and observation. Intake form has been scanned into medical record. Patient has been instructed to contact their primary care physician regarding ROS issues if not already being addressed at this time.        Co-morbidities/Complexities (which will affect course of rehabilitation):   []None              Arthritic conditions   []Rheumatoid arthritis (M05.9)  []Osteoarthritis (M19.91)    Cardiovascular conditions   [x]Hypertension (I10)  [x]Hyperlipidemia (E78.5)  []Angina pectoris (I20)  []Atherosclerosis (I70)    Musculoskeletal conditions   []Disc pathology   []Congenital spine pathologies   []Prior surgical intervention  []Osteoporosis (M81.8)  []Osteopenia (M85.8)   Endocrine conditions   []Hypothyroid (E03.9)  []Hyperthyroid Gastrointestinal conditions   []Constipation (S05.92)    Metabolic conditions   []Morbid obesity (E66.01)  []Diabetes type 1(E10.65) or 2 (E11.65)   []Neuropathy (G60.9)      Pulmonary conditions   []Asthma (J45)  []Coughing   []COPD (J44.9)    Psychological Disorders  []Anxiety (F41.9)  []Depression (F32.9)   []Other:    [x]Other:  CAD, past CVA          Barriers to/and or personal factors that will affect rehab potential:              [x]Age  []Sex              []Motivation/Lack of Motivation                        []Co-Morbidities              []Cognitive Function, education/learning barriers              []Environmental, home barriers              []profession/work barriers  []past PT/medical experience  []other:  Justification:      Falls Risk Assessment (30 days):   [x] Falls Risk assessed and no intervention required.   [] Falls Risk assessed and Patient requires intervention due to being higher risk   TUG score (>12s at risk):     [] Falls education provided, including            ASSESSMENT:   Functional Impairments:                [x]Noted lumbar/proximal hip hypomobility              []Noted lumbosacral and/or generalized hypermobility              [x]Decreased Lumbosacral/hip/LE functional ROM              [x]Decreased core/proximal hip strength and neuromuscular control               [x]Decreased LE functional strength               []Abnormal reflexes/sensation/myotomal/dermatomal deficits  [x]Reduced balance/proprioceptive control               []other:       Functional Activity Limitations (from functional questionnaire and intake)              [x]Reduced ability to tolerate prolonged functional positions              [x]Reduced ability or difficulty with changes of positions or transfers between positions              [x]Reduced ability to maintain good posture and demonstrate good body mechanics with sitting, bending, and lifting              [x]Reduced ability to sleep              [x] Reduced ability or tolerance with driving and/or computer work              [x]Reduced ability to perform lifting, reaching, carrying tasks              [x]Reduced ability to squat              []Reduced ability to forward bend              [x]Reduced ability to ambulate prolonged functional periods/distances/surfaces              [x]Reduced ability to ascend/descend stairs              []other:       Participation Restrictions              [x]Reduced participation in self care activities              [x]Reduced participation in home management activities              []Reduced participation in work activities              [x]Reduced participation in social activities. []Reduced participation in sport/recreational activities. Classification:              [x]Signs/symptoms consistent with Lumbar instability/stabilization subgroup. []Signs/symptoms consistent with Lumbar mobilization/manipulation subgroup, myotomes and dermatomes intact. Meets manipulation criteria. []Signs/symptoms consistent with Lumbar direction specific/centralization subgroup              []Signs/symptoms consistent with Lumbar traction subgroup                            []Signs/symptoms consistent with lumbar facet dysfunction              []Signs/symptoms consistent with lumbar stenosis type dysfunction              [x]Signs/symptoms consistent with nerve root involvement including myotome & dermatome dysfunction              []Signs/symptoms consistent with post-surgical status including: decreased ROM, strength and function.               []signs/symptoms consistent with pathology which may benefit from Dry needling                []other:       Prognosis/Rehab Potential:                                       []Excellent              []Good                 [x]Fair              []Poor     Tolerance of evaluation/treatment:               []Excellent              [x]Good                 []Fair              []Poor     Physical Therapy Evaluation Complexity Justification  [x] A history of present problem with:  [] no personal factors and/or comorbidities that impact the plan of care;  [x]1-2 personal factors and/or comorbidities that impact the plan of care  []3 personal factors and/or comorbidities that impact the plan of care  [x] An examination of body systems using standardized tests and measures addressing any of the following: body structures and functions (impairments), activity limitations, and/or participation restrictions;:  [x] a total of 1-2 or more elements   [] a total of 3 or more elements   [] a total of 4 or more elements   [x] A clinical presentation with:  [x] stable and/or uncomplicated characteristics   [] evolving clinical presentation with changing characteristics  [] unstable and unpredictable characteristics;   [x] Clinical decision making of [x] low, [] moderate, [] high complexity using standardized patient assessment instrument and/or measurable assessment of functional outcome. [x] EVAL (LOW) 48994 (typically 20 minutes face-to-face)  [] EVAL (MOD) 20018 (typically 30 minutes face-to-face)  [] EVAL (HIGH) 37253 (typically 45 minutes face-to-face)  [] RE-EVAL            PLAN:      Frequency/Duration:  2 days per week for 5 Weeks:  Interventions:  [x]  Therapeutic exercise including: strength training, ROM, for LE, Glutes and core   [x]  NMR activation and proprioception for glutes , LE and Core   [x]  Manual therapy as indicated for Hip complex, LE and spine to include: Dry Needling/IASTM, STM, PROM, Gr I-IV mobilizations, manipulation. [x]  Modalities as needed that may include: thermal agents, E-stim, Biofeedback, US, iontophoresis as indicated  [x]  Patient education on joint protection, postural re-education, activity modification, progression of HEP.      HEP instruction: [id number]     GOALS:   Patient stated goal: Improve back and leg pain     [] Progressing: [] Met: [] Not Met: [] Adjusted Therapist goals for Patient:   Short Term Goals: To be achieved in: 2 weeks  1. Independent in HEP and progression per patient tolerance, in order to prevent re-injury. [] Progressing: [] Met: [] Not Met: [] Adjusted   2. Patient will have a decrease in pain to facilitate improvement in movement, function, and ADLs as indicated by Functional Deficits. [] Progressing: [] Met: [] Not Met: [] Adjusted     Long Term Goals: To be achieved in: 5 weeks  1. Will Improve FOTO by 5 points to demonstrate improvement in function   [] Progressing: [] Met: [] Not Met: [] Adjusted  2. Patient will demonstrate increased AROM to WNL, good LS mobility, good hip ROM to allow for proper joint functioning as indicated by patients Functional Deficits. [] Progressing: [] Met: [] Not Met: [] Adjusted  3. Patient will demonstrate an increase in Strength to good proximal hip and core activation to allow for proper functional mobility as indicated by patients Functional Deficits. [] Progressing: [] Met: [] Not Met: [] Adjusted  4. Patient will return to daily functional activities without increased symptoms or restriction. [] Progressing: [] Met: [] Not Met: [] Adjusted  5.  Pt will report at least a 25-50% improvement in frequency and intensity of back/leg pain   [] Progressing: [] Met: [] Not Met: [] Adjusted            Electronically signed by: Heraclio Quiroz PT

## 2022-09-15 ENCOUNTER — HOSPITAL ENCOUNTER (OUTPATIENT)
Dept: PHYSICAL THERAPY | Age: 72
Setting detail: THERAPIES SERIES
Discharge: HOME | End: 2022-09-15
Payer: MEDICARE

## 2022-09-15 NOTE — PROGRESS NOTES
[facility] and [address],  [facility], [facility] [address]  793 [facility],5Th Floor   [place]  Phone: [phone]  Fax: [phone]     Physical Therapy  Cancellation/No-show Note  Patient Name:  Nasrin Asher  :  1950   Date:  9/15/2022  Cancelled visits to date: 1  No-shows to date: 0    For today's appointment patient:  [x]  Cancelled  []  Rescheduled appointment  []  No-show     Reason given by patient:  [x]  Patient ill  []  Conflicting appointment   []  No transportation    []  Conflict with work  []  No reason given  []  Other:     Comments:      Electronically signed by:  Yary Gant PT, PT

## 2023-02-22 RX ORDER — FENOFIBRATE 160 MG/1
TABLET ORAL
Qty: 90 TABLET | Refills: 3 | Status: SHIPPED | OUTPATIENT
Start: 2023-02-22

## 2023-02-22 NOTE — TELEPHONE ENCOUNTER
Medication:   Requested Prescriptions     Pending Prescriptions Disp Refills    fenofibrate (TRIGLIDE) 160 MG tablet [Pharmacy Med Name: FENOFIBRATE 160 MG TABLET] 90 tablet 3     Sig: TAKE ONE TABLET BY MOUTH DAILY       Last appt: 3/10/22  Next appt: Visit date not found    Last Lipid:   Lab Results   Component Value Date/Time    CHOL 173 08/13/2020 07:07 AM    TRIG 545 08/13/2020 07:07 AM    HDL 26 08/13/2020 07:07 AM    HDL 27 04/18/2012 07:14 AM    LDLCALC see below 08/13/2020 07:07 AM

## 2023-02-22 NOTE — TELEPHONE ENCOUNTER
Called and got pt scheduled with [facility] 4/20 pt said they had to have closer to end of april

## 2023-02-22 NOTE — TELEPHONE ENCOUNTER
Patient is due in March for follow up with Dr Blu Shaffer. I do not see an appt scheduled. Can you please call to schedule. Thank you!

## 2023-03-20 NOTE — TELEPHONE ENCOUNTER
Medication Refill    Medication needing refilled: lisinopril (PRINIVIL;ZESTRIL) 5 MG- Only has 8 pills left      Dosage of the medication:    How are you taking this medication (QD, BID, TID, QID, PRN): once daily    30 or 90 day supply called in:    When will you run out of your medication:    Which Pharmacy are we sending the medication to?: [facility] [id number] - [facility], [address] [place] [address] [phone]

## 2023-03-20 NOTE — TELEPHONE ENCOUNTER
Received refill request for Lisinopril from [address] [place] [address].     Last ov:03/10/2022 GIA    Last labs:12/31/2021 BMP    Last Refill:03/10/2022     Next appointment:04/20/2023 GIA

## 2023-03-21 RX ORDER — LISINOPRIL 5 MG/1
TABLET ORAL
Qty: 90 TABLET | Refills: 0 | Status: SHIPPED | OUTPATIENT
Start: 2023-03-21

## 2023-04-20 ENCOUNTER — OFFICE VISIT (OUTPATIENT)
Dept: CARDIOLOGY CLINIC | Age: 73
End: 2023-04-20
Payer: MEDICARE

## 2023-04-20 VITALS
BODY MASS INDEX: 26.19 KG/M2 | SYSTOLIC BLOOD PRESSURE: 132 MMHG | OXYGEN SATURATION: 95 % | HEART RATE: 89 BPM | DIASTOLIC BLOOD PRESSURE: 84 MMHG | WEIGHT: 147.8 LBS | HEIGHT: 63 IN

## 2023-04-20 DIAGNOSIS — E11.9 TYPE 2 DIABETES MELLITUS WITHOUT COMPLICATION, WITHOUT LONG-TERM CURRENT USE OF INSULIN (HCC): ICD-10-CM

## 2023-04-20 DIAGNOSIS — I10 PRIMARY HYPERTENSION: ICD-10-CM

## 2023-04-20 DIAGNOSIS — I63.9 CEREBROVASCULAR ACCIDENT (CVA), UNSPECIFIED MECHANISM (HCC): ICD-10-CM

## 2023-04-20 DIAGNOSIS — I25.10 CORONARY ARTERY DISEASE INVOLVING NATIVE CORONARY ARTERY OF NATIVE HEART WITHOUT ANGINA PECTORIS: Primary | ICD-10-CM

## 2023-04-20 DIAGNOSIS — E78.2 MIXED HYPERLIPIDEMIA: ICD-10-CM

## 2023-04-20 PROBLEM — R41.3 MEMORY DEFICIT: Status: ACTIVE | Noted: 2023-04-20

## 2023-04-20 PROCEDURE — 1090F PRES/ABSN URINE INCON ASSESS: CPT | Performed by: INTERNAL MEDICINE

## 2023-04-20 PROCEDURE — G8417 CALC BMI ABV UP PARAM F/U: HCPCS | Performed by: INTERNAL MEDICINE

## 2023-04-20 PROCEDURE — G8400 PT W/DXA NO RESULTS DOC: HCPCS | Performed by: INTERNAL MEDICINE

## 2023-04-20 PROCEDURE — 3046F HEMOGLOBIN A1C LEVEL >9.0%: CPT | Performed by: INTERNAL MEDICINE

## 2023-04-20 PROCEDURE — 3075F SYST BP GE 130 - 139MM HG: CPT | Performed by: INTERNAL MEDICINE

## 2023-04-20 PROCEDURE — 3079F DIAST BP 80-89 MM HG: CPT | Performed by: INTERNAL MEDICINE

## 2023-04-20 PROCEDURE — 1036F TOBACCO NON-USER: CPT | Performed by: INTERNAL MEDICINE

## 2023-04-20 PROCEDURE — 1123F ACP DISCUSS/DSCN MKR DOCD: CPT | Performed by: INTERNAL MEDICINE

## 2023-04-20 PROCEDURE — G8427 DOCREV CUR MEDS BY ELIG CLIN: HCPCS | Performed by: INTERNAL MEDICINE

## 2023-04-20 PROCEDURE — 99214 OFFICE O/P EST MOD 30 MIN: CPT | Performed by: INTERNAL MEDICINE

## 2023-04-20 PROCEDURE — 2022F DILAT RTA XM EVC RTNOPTHY: CPT | Performed by: INTERNAL MEDICINE

## 2023-04-20 PROCEDURE — 3017F COLORECTAL CA SCREEN DOC REV: CPT | Performed by: INTERNAL MEDICINE

## 2023-04-20 PROCEDURE — 93000 ELECTROCARDIOGRAM COMPLETE: CPT | Performed by: INTERNAL MEDICINE

## 2023-04-24 RX ORDER — CLOPIDOGREL BISULFATE 75 MG/1
TABLET ORAL
Qty: 90 TABLET | Refills: 3 | Status: SHIPPED | OUTPATIENT
Start: 2023-04-24

## 2023-04-24 NOTE — TELEPHONE ENCOUNTER
Medication Refill    Medication needing refilled:  clopidogrel (PLAVIX)    Dosage of the medication:  75 MG tablet    How are you taking this medication (QD, BID, TID, QID, PRN):TAKE 1 TABLET BY MOUTH EVERY DAY    30 or 90 day supply called in:  90 tablet    When will you run out of your medication:  Currently Out    Which Pharmacy are we sending the medication to?:    [facility] [id number] - [facility], [address] [place] [address] [phone]   [address] [place] [address] Drive   Phone:  [phone]  Fax:  [phone]

## 2023-05-04 ENCOUNTER — TELEPHONE (OUTPATIENT)
Dept: CARDIOLOGY CLINIC | Age: 73
End: 2023-05-04

## 2023-05-04 RX ORDER — RANOLAZINE 500 MG/1
TABLET, EXTENDED RELEASE ORAL
Qty: 180 TABLET | Refills: 3 | Status: CANCELLED | OUTPATIENT
Start: 2023-05-04

## 2023-05-04 RX ORDER — RANOLAZINE 500 MG/1
500 TABLET, FILM COATED, EXTENDED RELEASE ORAL 2 TIMES DAILY
Qty: 180 TABLET | Refills: 3 | Status: SHIPPED | OUTPATIENT
Start: 2023-05-04

## 2023-05-04 NOTE — TELEPHONE ENCOUNTER
Pt has change in pharmacy     Medication Refill    Medication needing refilled:  ranolazine (RANEXA) 500 MG extended release tablet     Dosage of the medication:  As Directed  How are you taking this medication (QD, BID, TID, QID, PRN):  Take 1 tablet po BID  30 or 90 day supply called in:  90 day supply   Which Pharmacy are we sending the medication to?:    Pt only has couple days left     [facility] [id number] - [facility], [address] [place] [address] [phone]   [facility] [address]   Phone:  [phone]  Fax:  [phone]

## 2023-05-04 NOTE — TELEPHONE ENCOUNTER
Last OV: 23 WAK  Next OV: on recall list   Last labs: 21 BMP  Last EK23  Last filled:   Disp Refills Start End    ranolazine (RANEXA) 500 MG extended release tablet 180 tablet 3 2022     Sig: Take 1 tablet po BID    Sent to pharmacy as: Ranolazine  MG Oral Tablet Extended Release 12 Hour (RANEXA)    E-Prescribing Status: Receipt confirmed by pharmacy (2022 11:10 AM EDT)

## 2023-05-04 NOTE — TELEPHONE ENCOUNTER
Pt calling back and states that new pharmacy is [facility] [id number] - [id number], [address] -  [phone]     Please script for Ranexa.

## 2023-05-04 NOTE — TELEPHONE ENCOUNTER
Allergy to ranolazine comes up, stating branded Ranexa only. But the last Rx looks like it was the generic. I LMOM for [facility] asking her to call us with which one she needs sent in. Branded Ranexa has been pended.

## 2023-05-05 ENCOUNTER — TELEPHONE (OUTPATIENT)
Dept: CARDIOLOGY CLINIC | Age: 73
End: 2023-05-05

## 2023-05-05 NOTE — TELEPHONE ENCOUNTER
Eduin Mcclain called to inform the office that the Pt insurance will not pay for Brand med Ranexa 500mg. Need a new script for the generic Ranolazine 500mg. Please fax to:  [phone]. Please advise.   Thank you

## 2023-05-05 NOTE — TELEPHONE ENCOUNTER
Called pharmacy and had to LM. I advised via VM  that the patient has allergy listed in chart for generic Ranolazine and has to Renxa. I will not send a new script at this time. Advised to call the office back if they have further questions.

## 2023-05-06 RX ORDER — RANOLAZINE 500 MG/1
500 TABLET, EXTENDED RELEASE ORAL 2 TIMES DAILY
Qty: 60 TABLET | Refills: 5 | Status: SHIPPED | OUTPATIENT
Start: 2023-05-06

## 2023-06-22 ENCOUNTER — TELEPHONE (OUTPATIENT)
Dept: UROGYNECOLOGY | Age: 73
End: 2023-06-22

## 2023-06-22 NOTE — TELEPHONE ENCOUNTER
Pt has a very sore bottom and can hardly sit. Wanted to come in today to see Arian.  I told her she was out of the office and a nurse would give her a call

## 2023-06-22 NOTE — TELEPHONE ENCOUNTER
Pt states for the past few days, she has had terrible itching on rectum and burning with urination. Pt has history of Lichen sclerosis. Scheduled pt for a f/u on 7/13/2023 for 8:30am at [facility]. For discomfort in the mean time, this RN recommended trying over-the-counter monistat or hemorrhoid cream to see if that helps. Patient verbalizes understanding of all of the above, and has no further questions or concerns at this time. Encouraged to call back the office should any questions/concerns arise.  6/22/2023 at 10:05 AM.

## 2023-06-30 DIAGNOSIS — I25.83 CORONARY ARTERY DISEASE DUE TO LIPID RICH PLAQUE: ICD-10-CM

## 2023-06-30 DIAGNOSIS — I10 PRIMARY HYPERTENSION: Primary | ICD-10-CM

## 2023-06-30 DIAGNOSIS — R06.02 SOB (SHORTNESS OF BREATH) ON EXERTION: ICD-10-CM

## 2023-06-30 DIAGNOSIS — I25.10 CORONARY ARTERY DISEASE DUE TO LIPID RICH PLAQUE: ICD-10-CM

## 2023-07-03 RX ORDER — LISINOPRIL 5 MG/1
TABLET ORAL
Qty: 30 TABLET | Refills: 0 | Status: SHIPPED | OUTPATIENT
Start: 2023-07-03

## 2023-07-03 NOTE — TELEPHONE ENCOUNTER
Lisinopril sent to [facility] for 30 days with no refills    Lab orders faxed to PCP office per her request.

## 2023-07-03 NOTE — TELEPHONE ENCOUNTER
Pt calling back. She has not gotten her refill of Lisinopril. She has one pill left. I relayed message that she needs labs. She stated that she is getting labs with PCP next week. [facility] orders faxed over- receipt confirmed.

## 2023-07-10 DIAGNOSIS — I25.10 CORONARY ARTERY DISEASE DUE TO LIPID RICH PLAQUE: ICD-10-CM

## 2023-07-10 DIAGNOSIS — I25.83 CORONARY ARTERY DISEASE DUE TO LIPID RICH PLAQUE: ICD-10-CM

## 2023-07-10 DIAGNOSIS — I10 PRIMARY HYPERTENSION: ICD-10-CM

## 2023-07-12 RX ORDER — LISINOPRIL 5 MG/1
TABLET ORAL
Qty: 90 TABLET | Refills: 1 | Status: SHIPPED | OUTPATIENT
Start: 2023-07-12

## 2023-11-02 DIAGNOSIS — I10 PRIMARY HYPERTENSION: ICD-10-CM

## 2023-11-02 DIAGNOSIS — I25.10 CORONARY ARTERY DISEASE DUE TO LIPID RICH PLAQUE: ICD-10-CM

## 2023-11-02 DIAGNOSIS — I25.83 CORONARY ARTERY DISEASE DUE TO LIPID RICH PLAQUE: ICD-10-CM

## 2023-11-02 NOTE — TELEPHONE ENCOUNTER
Received refill request for Lisinopril from [address] [place] [address]      Last OV: 04/20/2023    Next OV: 04/20/2024    Last Labs: 07/03/223    Last Filled:  07/12/2023  lisinopril (PRINIVIL;ZESTRIL) 5 MG tablet 90 tablet 1 7/12/2023     Sig: TAKE 1 TABLET BY MOUTH EVERY DAY    Sent to pharmacy as: Lisinopril 5 MG Oral Tablet (PRINIVIL;ZESTRIL)    E-Prescribing Status: Receipt confirmed by pharmacy (7/12/2023  2:49 PM EDT)

## 2023-11-03 RX ORDER — LISINOPRIL 5 MG/1
TABLET ORAL
Qty: 90 TABLET | Refills: 3 | OUTPATIENT
Start: 2023-11-03

## 2023-11-03 NOTE — TELEPHONE ENCOUNTER
Please call pcp office and ask for most recent labs to be faxed over. Refill pending results. Thank you!

## 2023-11-10 NOTE — TELEPHONE ENCOUNTER
Medication Refill    Medication needing refilled:  ranolazine    Dosage of the medication:  500mg    How are you taking this medication (QD, BID, TID, QID, PRN):  Take 1 tablet by mouth 2 times daily    30 or 90 day supply called in:  120    When will you run out of your medication:    Which Pharmacy are we sending the medication to?:    [facility] [id number]  [address] [address]   Phone:  [phone]  Fax:  [phone]

## 2023-11-10 NOTE — TELEPHONE ENCOUNTER
Last OV: 23 [facility]   Next OV: on RC list  Last EK23   Last Fill:   ranolazine (RANEXA) 500 MG extended release tablet 180 tablet 3 2022     Sig: Take 1 tablet po BID    Sent to pharmacy as: Ranolazine  MG Oral Tablet Extended Release 12 Hour (RANEXA)    E-Prescribing Status: Receipt confirmed by pharmacy (2022 11:10 AM EDT)

## 2023-11-13 RX ORDER — RANOLAZINE 500 MG/1
TABLET, EXTENDED RELEASE ORAL
Qty: 180 TABLET | Refills: 3 | Status: SHIPPED | OUTPATIENT
Start: 2023-11-13

## 2023-11-13 RX ORDER — RANOLAZINE 500 MG/1
500 TABLET, FILM COATED, EXTENDED RELEASE ORAL 2 TIMES DAILY
Qty: 180 TABLET | Refills: 3 | Status: CANCELLED | OUTPATIENT
Start: 2023-11-13

## 2023-11-13 RX ORDER — RANOLAZINE 500 MG/1
500 TABLET, FILM COATED, EXTENDED RELEASE ORAL 2 TIMES DAILY
Qty: 180 TABLET | Refills: 3 | Status: SHIPPED | OUTPATIENT
Start: 2023-11-13

## 2024-01-30 DIAGNOSIS — I25.10 CORONARY ARTERY DISEASE DUE TO LIPID RICH PLAQUE: ICD-10-CM

## 2024-01-30 DIAGNOSIS — I25.83 CORONARY ARTERY DISEASE DUE TO LIPID RICH PLAQUE: ICD-10-CM

## 2024-01-30 DIAGNOSIS — I10 PRIMARY HYPERTENSION: ICD-10-CM

## 2024-01-30 RX ORDER — LISINOPRIL 5 MG/1
TABLET ORAL
Qty: 90 TABLET | Refills: 3 | OUTPATIENT
Start: 2024-01-30

## 2024-01-30 RX ORDER — LISINOPRIL 5 MG/1
TABLET ORAL
Qty: 90 TABLET | Refills: 1 | Status: SHIPPED | OUTPATIENT
Start: 2024-01-30

## 2024-01-30 NOTE — TELEPHONE ENCOUNTER
Last OV: 23 [facility]  Next OV: on recall list   Last labs: CBC and BMP 21  Last EK23  Last filled:  lisinopril (PRINIVIL;ZESTRIL) 5 MG tablet 90 tablet 1 2023 --    Sig: TAKE 1 TABLET BY MOUTH EVERY DAY    Sent to pharmacy as: Lisinopril 5 MG Oral Tablet (PRINIVIL;ZESTRIL)    E-Prescribing Status: Receipt confirmed by pharmacy (2023  2:49 PM EDT)

## 2024-01-30 NOTE — TELEPHONE ENCOUNTER
Called pharmacy and they didn't receive the faxed prescription back in November.   Can she have the refill?    Please Advise.

## 2024-02-19 NOTE — TELEPHONE ENCOUNTER
Lov 4/20/2023 Dr Eckert  Labs no labs  Lrf Triglide 160 mg disp 90+3 02/22/2023  Appt no appt scheduled please advise

## 2024-02-20 ENCOUNTER — TELEPHONE (OUTPATIENT)
Dept: CARDIOLOGY CLINIC | Age: 74
End: 2024-02-20

## 2024-02-20 DIAGNOSIS — E78.2 MIXED HYPERLIPIDEMIA: Primary | ICD-10-CM

## 2024-02-20 RX ORDER — FENOFIBRATE 160 MG/1
TABLET ORAL
Qty: 90 TABLET | Refills: 1 | Status: SHIPPED | OUTPATIENT
Start: 2024-02-20

## 2024-02-20 NOTE — TELEPHONE ENCOUNTER
Please call patient and schedule for 1 year follow up with GIA and advise that she is due for labs prior to appointment. Thank yoU!

## 2024-02-20 NOTE — TELEPHONE ENCOUNTER
Please send to Dr. Eckert for signature of the fenofibrate as the patient does not follow with Dr. Brown. Not sure why this was sent to Dr. Brown.

## 2024-02-21 NOTE — TELEPHONE ENCOUNTER
Called and spoke to pt.  She wants to wait until about mid-May for her appt.  Nothing available in [place] (only office she wants) w/[facility] until July.  Please adv of date/time she can be worked in.  Thank you.

## 2024-02-22 NOTE — TELEPHONE ENCOUNTER
Pt can be added on to an office day in April in between hours or she can be added to July. We do not have any other openings here. If she doesn't want either of these and does not want to go to another office, she would need scheduled with another provider. Thank you!

## 2024-02-22 NOTE — TELEPHONE ENCOUNTER
LM on both #'s for pt to call and schedule.  Adv if she only wants FF and to see [facility] she will need to schedule in April or July.  See notes from Myah below.

## 2024-04-19 ENCOUNTER — OFFICE VISIT (OUTPATIENT)
Dept: CARDIOLOGY CLINIC | Age: 74
End: 2024-04-19

## 2024-04-19 ENCOUNTER — TELEPHONE (OUTPATIENT)
Dept: CARDIOLOGY CLINIC | Age: 74
End: 2024-04-19

## 2024-04-19 VITALS
DIASTOLIC BLOOD PRESSURE: 80 MMHG | SYSTOLIC BLOOD PRESSURE: 132 MMHG | OXYGEN SATURATION: 100 % | HEART RATE: 73 BPM | WEIGHT: 156.5 LBS | BODY MASS INDEX: 27.73 KG/M2 | HEIGHT: 63 IN

## 2024-04-19 DIAGNOSIS — I25.83 CORONARY ARTERY DISEASE DUE TO LIPID RICH PLAQUE: Primary | ICD-10-CM

## 2024-04-19 DIAGNOSIS — E78.2 MIXED HYPERLIPIDEMIA: ICD-10-CM

## 2024-04-19 DIAGNOSIS — I10 PRIMARY HYPERTENSION: ICD-10-CM

## 2024-04-19 DIAGNOSIS — E11.9 TYPE 2 DIABETES MELLITUS WITHOUT COMPLICATION, WITHOUT LONG-TERM CURRENT USE OF INSULIN (HCC): ICD-10-CM

## 2024-04-19 DIAGNOSIS — I63.9 CEREBROVASCULAR ACCIDENT (CVA), UNSPECIFIED MECHANISM (HCC): ICD-10-CM

## 2024-04-19 DIAGNOSIS — I25.10 CORONARY ARTERY DISEASE DUE TO LIPID RICH PLAQUE: Primary | ICD-10-CM

## 2024-04-19 NOTE — TELEPHONE ENCOUNTER
I called at 's office and spoke to associate. I requested her to send pt's most recent labs. She said they have labs from February and she is going to fax us. I gave her our fax number  Call complete

## 2024-04-19 NOTE — PROGRESS NOTES
Office: [phone]    Documentation of today's visit sent to PCP,    NOTE:  This report was transcribed using voice recognition software.  Every effort was made to ensure accuracy; however, inadvertent computerized transcription errors may be present.

## 2024-04-24 NOTE — TELEPHONE ENCOUNTER
Requested Prescriptions     Pending Prescriptions Disp Refills    clopidogrel (PLAVIX) 75 MG tablet 90 tablet 3     Sig: TAKE 1 TABLET BY MOUTH EVERY DAY        [facility]     Last OV:  4/19/2024 [facility] include [facility] beds at the [facility]    Next OV: Visit date not found     Last Labs: 12/31/2024 CBC    Last Filled: 04/24/2023 GIA

## 2024-04-25 RX ORDER — CLOPIDOGREL BISULFATE 75 MG/1
TABLET ORAL
Qty: 90 TABLET | Refills: 3 | Status: SHIPPED | OUTPATIENT
Start: 2024-04-25

## 2024-07-29 DIAGNOSIS — I25.83 CORONARY ARTERY DISEASE DUE TO LIPID RICH PLAQUE: ICD-10-CM

## 2024-07-29 DIAGNOSIS — I25.10 CORONARY ARTERY DISEASE DUE TO LIPID RICH PLAQUE: ICD-10-CM

## 2024-07-29 DIAGNOSIS — I10 PRIMARY HYPERTENSION: ICD-10-CM

## 2024-07-29 DIAGNOSIS — I25.83 CORONARY ARTERY DISEASE DUE TO LIPID RICH PLAQUE: Primary | ICD-10-CM

## 2024-07-29 DIAGNOSIS — I25.10 CORONARY ARTERY DISEASE DUE TO LIPID RICH PLAQUE: Primary | ICD-10-CM

## 2024-07-29 RX ORDER — LISINOPRIL 5 MG/1
TABLET ORAL
Qty: 90 TABLET | Refills: 1 | Status: SHIPPED | OUTPATIENT
Start: 2024-07-29

## 2024-07-29 NOTE — TELEPHONE ENCOUNTER
Medication for Lisinopril signed. Orders put in computer to have BMP checked. Sent to the pool to notify patient.

## 2024-07-29 NOTE — TELEPHONE ENCOUNTER
Received refill request for Lisinopril 5mg tablets from [facility] pharmacy.     Last OV: 4- [facility]    Next OV: None.     Last Labs: 12- [facility]    Last Filled: 1- [facility]

## 2024-07-29 NOTE — TELEPHONE ENCOUNTER
I called  Chelly.  Mr. Stone states that she is not available right now as she is at the store. He asked if I could call back in about 30-45 minutes.

## 2024-07-29 NOTE — TELEPHONE ENCOUNTER
I spoke to Chelly. She understands the need for labs. However, she has a lot going with her  this week as he sustained a head injury and is undergoing outpatient testing. She asked to have it drawn in Sept at PCP office.

## 2024-07-29 NOTE — TELEPHONE ENCOUNTER
I called Dr. Norwood's office ([phone]). Chelly had labs drawn May 2024. Results received for Mary russo.

## 2024-07-30 NOTE — TELEPHONE ENCOUNTER
Mary reviewed the lab results from 5/7/24> Chelly does not need labs at this time.  Results scanned into her chart.     I LMOM with Chelly that she does not need labs drawn.

## 2024-11-04 DIAGNOSIS — I20.89 STABLE ANGINA (HCC): Primary | ICD-10-CM

## 2024-11-04 NOTE — TELEPHONE ENCOUNTER
Requested Prescriptions     Pending Prescriptions Disp Refills    ranolazine (RANEXA) 500 MG extended release tablet 180 tablet 3     Sig: Take 1 tablet po BID      [facility] [id number]    Last OV:  2024 [facility]    Next OV: 2025 [facility]    Last EK2024    Last Filled: 2023 GIA

## 2024-11-05 RX ORDER — RANOLAZINE 500 MG/1
TABLET, EXTENDED RELEASE ORAL
Qty: 180 TABLET | Refills: 3 | Status: SHIPPED | OUTPATIENT
Start: 2024-11-05

## 2025-01-22 DIAGNOSIS — I10 PRIMARY HYPERTENSION: ICD-10-CM

## 2025-01-22 DIAGNOSIS — I25.83 CORONARY ARTERY DISEASE DUE TO LIPID RICH PLAQUE: ICD-10-CM

## 2025-01-22 DIAGNOSIS — I25.10 CORONARY ARTERY DISEASE DUE TO LIPID RICH PLAQUE: ICD-10-CM

## 2025-01-22 RX ORDER — LISINOPRIL 5 MG/1
TABLET ORAL
Qty: 90 TABLET | Refills: 1 | Status: SHIPPED | OUTPATIENT
Start: 2025-01-22

## 2025-01-22 NOTE — TELEPHONE ENCOUNTER
Received refill request for  lisinopril (PRINIVIL;ZESTRIL) 5 MG tablet  from [facility] pharmacy.     Last OV: 4/19/2024 NP    Next OV: None    Last Labs: 12/31/2021 BMP    Last Filled: 7/29/2024 [facility]

## 2025-03-03 NOTE — TELEPHONE ENCOUNTER
Medication Refill    Medication needing refilled:  nitroGLYCERIN (NITROSTAT)    Dosage of the medication:  0.4 MG SL tablet   How are you taking this medication (QD, BID, TID, QID, PRN):  Place 1 tablet under the tongue every 5 minutes as needed for Chest pain   30 or 90 day supply called in:  25 tablets  When will you run out of your medication:  Script has   Which Pharmacy are we sending the medication to?:  [facility] [id number] [address] - P [phone] - F [phone]

## 2025-03-03 NOTE — TELEPHONE ENCOUNTER
Last OV: 4/19/24 Mary BRAVO NP  (Last OV with Dr. Brown 11/16/21  Next OV: Not scheduled.  Requested to return in one year.  Last refill: 2/7/22  Most recent Labs:   Last EKG (if needed):4/19/24

## 2025-03-04 RX ORDER — NITROGLYCERIN 0.4 MG/1
0.4 TABLET SUBLINGUAL EVERY 5 MIN PRN
Qty: 25 TABLET | Refills: 6 | Status: SHIPPED | OUTPATIENT
Start: 2025-03-04

## 2025-03-04 NOTE — TELEPHONE ENCOUNTER
Spoke to Kathleen, daughter (HIPAA) and advised of refill. Pt has not needed Nitro. Son will call back to schedule appt with GIA soon.

## 2025-03-06 RX ORDER — NITROGLYCERIN 0.4 MG/1
TABLET SUBLINGUAL
Qty: 25 TABLET | Refills: 6 | OUTPATIENT
Start: 2025-03-06

## 2025-03-06 NOTE — TELEPHONE ENCOUNTER
Routed to [facility] Staff for refill  3/3/25.   Patient following Dr GENNY Eckert and  LATESHA Israel NP.  Refill signed by Mary Israel NP.    Called Lavonne Peng [phone].  Spoke with Ranjana Barker.  Patient picked up Rx yesterday.  Ok to refuse this request since it is now a duplicate.  Will not affect future refills.

## 2025-04-21 NOTE — TELEPHONE ENCOUNTER
Received refill request for Clopidogrel 75 MG Tablet from [facility] pharmacy.     Last OV: 4/19/2024 NPJ    Next OV: None    Last Labs: 12/31/2021 CBC    Last Filled: 4/25/2024 GIA

## 2025-04-22 NOTE — TELEPHONE ENCOUNTER
GIA: Pts daughter was notified in March that pt is past due for appt. [facility] refilled nitro at that time with expectation of them scheduling an appt with you or her in April. Nothing scheduled as of yet.

## 2025-04-24 RX ORDER — CLOPIDOGREL BISULFATE 75 MG/1
TABLET ORAL
Qty: 90 TABLET | Refills: 3 | OUTPATIENT
Start: 2025-04-24

## 2025-04-25 RX ORDER — CLOPIDOGREL BISULFATE 75 MG/1
TABLET ORAL
Qty: 90 TABLET | Refills: 3 | OUTPATIENT
Start: 2025-04-25

## 2025-04-25 NOTE — TELEPHONE ENCOUNTER
Please review notes below and see that pt was notified of need for appt for refills. Please call again and advise. Thank you!     Mary Israel, APRN - CNP to Me      3/4/25  1:19 PM  Please call patient and have her schedule an appointment with  Dr. Eckert in April  If no openings can be seen by me  Let her know NTG is refilled. Is she having any chest pain since refilling NTG    3/4/25  1:42 PM  You contacted Kathleen Stone (Emergency Contact)    3/4/25  1:46 PM  You routed this conversation to Conor Eckert MD  [facility]    3/4/25  1:47 PM  Note     Spoke to Kathleen, daughter (HIPAA) and advised of refill. Pt has not needed Nitro. Son will call back to schedule appt with GIA soon.

## 2025-04-25 NOTE — TELEPHONE ENCOUNTER
Received refill request for Clopidogrel 75 MG Tablet from [facility] pharmacy.     Last OV: 4/19/2024 NPJ    Next OV: None    Last Labs: 12/31/2021 CBC    Last Filled: 4/25/2024 GIA

## 2025-04-28 RX ORDER — CLOPIDOGREL BISULFATE 75 MG/1
TABLET ORAL
Qty: 90 TABLET | Refills: 3 | OUTPATIENT
Start: 2025-04-28

## 2025-04-28 NOTE — TELEPHONE ENCOUNTER
Requested Prescriptions     Pending Prescriptions Disp Refills    clopidogrel (PLAVIX) 75 MG tablet 90 tablet 3     Sig: TAKE 1 TABLET BY MOUTH EVERY DAY      Last OV:  2024 [facility]IA    Next OV: Visit/ recall date not found     Last EK2024     Last Filled: 2024 GIA [place] for pt to call back to let her know she needs 1 year apt with GIA.

## 2025-05-09 ENCOUNTER — OFFICE VISIT (OUTPATIENT)
Dept: CARDIOLOGY CLINIC | Age: 75
End: 2025-05-09
Payer: MEDICARE

## 2025-05-09 VITALS
SYSTOLIC BLOOD PRESSURE: 144 MMHG | OXYGEN SATURATION: 99 % | HEART RATE: 76 BPM | DIASTOLIC BLOOD PRESSURE: 76 MMHG | HEIGHT: 62 IN | WEIGHT: 152 LBS | BODY MASS INDEX: 27.97 KG/M2

## 2025-05-09 DIAGNOSIS — E78.2 MIXED HYPERLIPIDEMIA: ICD-10-CM

## 2025-05-09 DIAGNOSIS — I10 PRIMARY HYPERTENSION: ICD-10-CM

## 2025-05-09 DIAGNOSIS — I25.10 CORONARY ARTERY DISEASE DUE TO LIPID RICH PLAQUE: Primary | ICD-10-CM

## 2025-05-09 DIAGNOSIS — I25.83 CORONARY ARTERY DISEASE DUE TO LIPID RICH PLAQUE: Primary | ICD-10-CM

## 2025-05-09 PROCEDURE — 99214 OFFICE O/P EST MOD 30 MIN: CPT | Performed by: NURSE PRACTITIONER

## 2025-05-09 PROCEDURE — 1090F PRES/ABSN URINE INCON ASSESS: CPT | Performed by: NURSE PRACTITIONER

## 2025-05-09 PROCEDURE — 3078F DIAST BP <80 MM HG: CPT | Performed by: NURSE PRACTITIONER

## 2025-05-09 PROCEDURE — 3017F COLORECTAL CA SCREEN DOC REV: CPT | Performed by: NURSE PRACTITIONER

## 2025-05-09 PROCEDURE — 1159F MED LIST DOCD IN RCRD: CPT | Performed by: NURSE PRACTITIONER

## 2025-05-09 PROCEDURE — 1123F ACP DISCUSS/DSCN MKR DOCD: CPT | Performed by: NURSE PRACTITIONER

## 2025-05-09 PROCEDURE — 1036F TOBACCO NON-USER: CPT | Performed by: NURSE PRACTITIONER

## 2025-05-09 PROCEDURE — G8427 DOCREV CUR MEDS BY ELIG CLIN: HCPCS | Performed by: NURSE PRACTITIONER

## 2025-05-09 PROCEDURE — 3077F SYST BP >= 140 MM HG: CPT | Performed by: NURSE PRACTITIONER

## 2025-05-09 PROCEDURE — G8419 CALC BMI OUT NRM PARAM NOF/U: HCPCS | Performed by: NURSE PRACTITIONER

## 2025-05-09 PROCEDURE — G8400 PT W/DXA NO RESULTS DOC: HCPCS | Performed by: NURSE PRACTITIONER

## 2025-05-09 NOTE — PROGRESS NOTES
[facility]     Outpatient Follow Up Noteu    Chelly Stone is 74 y.o. female who presents today with a history of CAD s/p CABG '11, HTN, prolonged ST and hyperlipidemia.  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 Once in a blue moon she'll have discomfort. There is no SOB/CASSIDY. The patient denies orthopnea/PND. The patient does not have swelling. The patients weight is stable . The patient is not experiencing palpitations or dizziness.   Her son says that she was started on a new medication yesterday for her BP ~ 160/     These symptoms show no change since the last OV.   With regard to medication therapy the patient has been compliant with prescribed regimen. They have tolerated therapy to date.     Past Medical History:   Diagnosis Date    CAD (coronary artery disease)     Cerebral artery occlusion with cerebral infarction (HCC)     Diabetes mellitus (HCC)     Hyperlipidemia     Hypertension     Kidney calculi     lithitripsy x2 2009 dec    Pneumonia 2009    Vegetarian diet      Social History:    Social History     Tobacco Use   Smoking Status Never   Smokeless Tobacco Never     Current Medications: cannot confirm accuracy of medications taken  Current Outpatient Medications   Medication Sig Dispense Refill    lisinopril (PRINIVIL;ZESTRIL) 5 MG tablet TAKE 1 TABLET BY MOUTH DAILY 90 tablet 1    clopidogrel (PLAVIX) 75 MG tablet TAKE 1 TABLET BY MOUTH EVERY DAY 90 tablet 3    Multiple Vitamin (MULTIVITAMINS PO) Take by mouth weekly      fenofibrate (TRIGLIDE) 160 MG tablet TAKE ONE TABLET BY MOUTH DAILY 90 tablet 1    ranolazine (RANEXA) 500 MG extended release tablet Take 1 tablet by mouth 2 times daily 60 tablet 5    rosuvastatin (CRESTOR) 40 MG tablet One tablet daily 90 tablet 3    Icosapent Ethyl (VASCEPA) 1 g CAPS capsule TAKE 2 CAPSULES BY MOUTH

## 2025-05-13 DIAGNOSIS — I25.10 CORONARY ARTERY DISEASE DUE TO LIPID RICH PLAQUE: Primary | ICD-10-CM

## 2025-05-13 DIAGNOSIS — I25.83 CORONARY ARTERY DISEASE DUE TO LIPID RICH PLAQUE: Primary | ICD-10-CM

## 2025-05-13 RX ORDER — CLOPIDOGREL BISULFATE 75 MG/1
TABLET ORAL
Qty: 90 TABLET | Refills: 3 | Status: SHIPPED | OUTPATIENT
Start: 2025-05-13

## 2025-05-13 NOTE — TELEPHONE ENCOUNTER
Received refill request for clopidogrel from [facility] pharmacy.     Last OV: 5/9/25    Next OV: none    Last Labs: cbc 12/31/21    Last Filled: 4/25/24

## 2025-06-12 ENCOUNTER — TELEPHONE (OUTPATIENT)
Dept: CARDIOLOGY CLINIC | Age: 75
End: 2025-06-12

## 2025-06-12 RX ORDER — RANOLAZINE 500 MG/1
500 TABLET, FILM COATED, EXTENDED RELEASE ORAL 2 TIMES DAILY
Qty: 180 TABLET | Refills: 1 | Status: CANCELLED | OUTPATIENT
Start: 2025-06-12

## 2025-06-12 NOTE — TELEPHONE ENCOUNTER
LM for pt to call back to verify she is not allergic to this medication.  Adverse reaction has been noted.

## 2025-06-12 NOTE — TELEPHONE ENCOUNTER
Medication Refill    Medication needing refilled:  ranolazine (RANEXA) 500 MG extended release tablet   Dosage of the medication:    How are you taking this medication (QD, BID, TID, QID, PRN):  Take 1 tablet by mouth 2 times daily   30 or 90 day supply called in:  90  When will you run out of your medication:    Which [facility] are we [facility] the medication [facility]?:  [facility] [id number] [place] [id number] [facility] - P [phone] - F [phone]

## 2025-06-12 NOTE — TELEPHONE ENCOUNTER
Spoke with patient and her ,  patient stated she is allergic to the generic form of the Ranexa.  She must take the brand name form.

## 2025-06-17 RX ORDER — RANOLAZINE 500 MG/1
500 TABLET, EXTENDED RELEASE ORAL 2 TIMES DAILY
Qty: 180 TABLET | Refills: 3 | Status: SHIPPED | OUTPATIENT
Start: 2025-06-17

## 2025-07-29 DIAGNOSIS — I10 PRIMARY HYPERTENSION: ICD-10-CM

## 2025-07-29 DIAGNOSIS — I25.10 CORONARY ARTERY DISEASE DUE TO LIPID RICH PLAQUE: ICD-10-CM

## 2025-07-29 DIAGNOSIS — I25.83 CORONARY ARTERY DISEASE DUE TO LIPID RICH PLAQUE: ICD-10-CM

## 2025-07-29 RX ORDER — LISINOPRIL 5 MG/1
TABLET ORAL
Qty: 90 TABLET | Refills: 1 | Status: SHIPPED | OUTPATIENT
Start: 2025-07-29